# Patient Record
Sex: FEMALE | Race: WHITE | NOT HISPANIC OR LATINO
[De-identification: names, ages, dates, MRNs, and addresses within clinical notes are randomized per-mention and may not be internally consistent; named-entity substitution may affect disease eponyms.]

---

## 2017-07-05 ENCOUNTER — RX RENEWAL (OUTPATIENT)
Age: 82
End: 2017-07-05

## 2017-07-05 DIAGNOSIS — F32.9 ANXIETY DISORDER, UNSPECIFIED: ICD-10-CM

## 2017-07-05 DIAGNOSIS — E78.00 PURE HYPERCHOLESTEROLEMIA, UNSPECIFIED: ICD-10-CM

## 2017-07-05 DIAGNOSIS — F41.9 ANXIETY DISORDER, UNSPECIFIED: ICD-10-CM

## 2017-10-16 ENCOUNTER — RX RENEWAL (OUTPATIENT)
Age: 82
End: 2017-10-16

## 2017-10-16 RX ORDER — ESCITALOPRAM OXALATE 10 MG/1
10 TABLET ORAL
Qty: 30 | Refills: 0 | Status: ACTIVE | COMMUNITY
Start: 2017-07-05 | End: 1900-01-01

## 2017-10-27 ENCOUNTER — INPATIENT (INPATIENT)
Facility: HOSPITAL | Age: 82
LOS: 1 days | Discharge: HOME | End: 2017-10-29
Payer: MEDICARE

## 2017-10-27 VITALS
WEIGHT: 138.01 LBS | TEMPERATURE: 98 F | OXYGEN SATURATION: 99 % | HEART RATE: 62 BPM | HEIGHT: 67 IN | RESPIRATION RATE: 16 BRPM | DIASTOLIC BLOOD PRESSURE: 78 MMHG | SYSTOLIC BLOOD PRESSURE: 138 MMHG

## 2017-10-27 LAB
ALBUMIN SERPL ELPH-MCNC: 3.3 G/DL — SIGNIFICANT CHANGE UP (ref 3.3–5)
ALP SERPL-CCNC: 51 U/L — SIGNIFICANT CHANGE UP (ref 40–120)
ALT FLD-CCNC: 18 U/L — SIGNIFICANT CHANGE UP (ref 12–78)
ANION GAP SERPL CALC-SCNC: 6 MMOL/L — SIGNIFICANT CHANGE UP (ref 5–17)
APPEARANCE UR: CLEAR — SIGNIFICANT CHANGE UP
APTT BLD: 26.9 SEC — LOW (ref 27.5–37.4)
AST SERPL-CCNC: 24 U/L — SIGNIFICANT CHANGE UP (ref 15–37)
BACTERIA # UR AUTO: (no result)
BASOPHILS # BLD AUTO: 0.1 K/UL — SIGNIFICANT CHANGE UP (ref 0–0.2)
BASOPHILS NFR BLD AUTO: 1 % — SIGNIFICANT CHANGE UP (ref 0–2)
BILIRUB SERPL-MCNC: 0.8 MG/DL — SIGNIFICANT CHANGE UP (ref 0.2–1.2)
BILIRUB UR-MCNC: NEGATIVE — SIGNIFICANT CHANGE UP
BUN SERPL-MCNC: 26 MG/DL — HIGH (ref 7–23)
CALCIUM SERPL-MCNC: 9 MG/DL — SIGNIFICANT CHANGE UP (ref 8.5–10.1)
CHLORIDE SERPL-SCNC: 110 MMOL/L — HIGH (ref 96–108)
CO2 SERPL-SCNC: 27 MMOL/L — SIGNIFICANT CHANGE UP (ref 22–31)
COLOR SPEC: YELLOW — SIGNIFICANT CHANGE UP
CREAT SERPL-MCNC: 1.05 MG/DL — SIGNIFICANT CHANGE UP (ref 0.5–1.3)
DIFF PNL FLD: NEGATIVE — SIGNIFICANT CHANGE UP
EOSINOPHIL # BLD AUTO: 0.3 K/UL — SIGNIFICANT CHANGE UP (ref 0–0.5)
EOSINOPHIL NFR BLD AUTO: 5 % — SIGNIFICANT CHANGE UP (ref 0–6)
EPI CELLS # UR: SIGNIFICANT CHANGE UP
GLUCOSE SERPL-MCNC: 75 MG/DL — SIGNIFICANT CHANGE UP (ref 70–99)
GLUCOSE UR QL: NEGATIVE MG/DL — SIGNIFICANT CHANGE UP
HCT VFR BLD CALC: 39.2 % — SIGNIFICANT CHANGE UP (ref 34.5–45)
HGB BLD-MCNC: 12.7 G/DL — SIGNIFICANT CHANGE UP (ref 11.5–15.5)
INR BLD: 1.11 RATIO — SIGNIFICANT CHANGE UP (ref 0.88–1.16)
KETONES UR-MCNC: NEGATIVE — SIGNIFICANT CHANGE UP
LEUKOCYTE ESTERASE UR-ACNC: (no result)
LYMPHOCYTES # BLD AUTO: 1.6 K/UL — SIGNIFICANT CHANGE UP (ref 1–3.3)
LYMPHOCYTES # BLD AUTO: 17 % — SIGNIFICANT CHANGE UP (ref 13–44)
MANUAL DIF COMMENT BLD-IMP: SIGNIFICANT CHANGE UP
MCHC RBC-ENTMCNC: 28.4 PG — SIGNIFICANT CHANGE UP (ref 27–34)
MCHC RBC-ENTMCNC: 32.5 GM/DL — SIGNIFICANT CHANGE UP (ref 32–36)
MCV RBC AUTO: 87.4 FL — SIGNIFICANT CHANGE UP (ref 80–100)
MONOCYTES # BLD AUTO: 0.5 K/UL — SIGNIFICANT CHANGE UP (ref 0–0.9)
MONOCYTES NFR BLD AUTO: 7 % — SIGNIFICANT CHANGE UP (ref 2–14)
NEUTROPHILS # BLD AUTO: 3.6 K/UL — SIGNIFICANT CHANGE UP (ref 1.8–7.4)
NEUTROPHILS NFR BLD AUTO: 62 % — SIGNIFICANT CHANGE UP (ref 43–77)
NITRITE UR-MCNC: NEGATIVE — SIGNIFICANT CHANGE UP
PH UR: 6 — SIGNIFICANT CHANGE UP (ref 5–8)
PLAT MORPH BLD: NORMAL — SIGNIFICANT CHANGE UP
PLATELET # BLD AUTO: 83 K/UL — LOW (ref 150–400)
PLATELET COUNT - ESTIMATE: (no result)
POTASSIUM SERPL-MCNC: 4.7 MMOL/L — SIGNIFICANT CHANGE UP (ref 3.5–5.3)
POTASSIUM SERPL-SCNC: 4.7 MMOL/L — SIGNIFICANT CHANGE UP (ref 3.5–5.3)
PROT SERPL-MCNC: 6.7 GM/DL — SIGNIFICANT CHANGE UP (ref 6–8.3)
PROT UR-MCNC: NEGATIVE MG/DL — SIGNIFICANT CHANGE UP
PROTHROM AB SERPL-ACNC: 12 SEC — SIGNIFICANT CHANGE UP (ref 9.8–12.7)
RBC # BLD: 4.48 M/UL — SIGNIFICANT CHANGE UP (ref 3.8–5.2)
RBC # FLD: 13 % — SIGNIFICANT CHANGE UP (ref 10.3–14.5)
RBC BLD AUTO: NORMAL — SIGNIFICANT CHANGE UP
RBC CASTS # UR COMP ASSIST: NEGATIVE /HPF — SIGNIFICANT CHANGE UP (ref 0–4)
SODIUM SERPL-SCNC: 143 MMOL/L — SIGNIFICANT CHANGE UP (ref 135–145)
SP GR SPEC: 1.01 — SIGNIFICANT CHANGE UP (ref 1.01–1.02)
TROPONIN I SERPL-MCNC: <0.015 NG/ML — SIGNIFICANT CHANGE UP (ref 0.01–0.04)
UROBILINOGEN FLD QL: NEGATIVE MG/DL — SIGNIFICANT CHANGE UP
VARIANT LYMPHS # BLD: 8 % — HIGH (ref 0–6)
WBC # BLD: 6.1 K/UL — SIGNIFICANT CHANGE UP (ref 3.8–10.5)
WBC # FLD AUTO: 6.1 K/UL — SIGNIFICANT CHANGE UP (ref 3.8–10.5)
WBC UR QL: SIGNIFICANT CHANGE UP

## 2017-10-27 PROCEDURE — 93306 TTE W/DOPPLER COMPLETE: CPT | Mod: 26

## 2017-10-27 PROCEDURE — 71010: CPT | Mod: 26

## 2017-10-27 PROCEDURE — 99285 EMERGENCY DEPT VISIT HI MDM: CPT

## 2017-10-27 PROCEDURE — 70450 CT HEAD/BRAIN W/O DYE: CPT | Mod: 26

## 2017-10-27 RX ORDER — LISINOPRIL 2.5 MG/1
10 TABLET ORAL DAILY
Qty: 0 | Refills: 0 | Status: DISCONTINUED | OUTPATIENT
Start: 2017-10-27 | End: 2017-10-29

## 2017-10-27 RX ORDER — ASPIRIN/CALCIUM CARB/MAGNESIUM 324 MG
81 TABLET ORAL DAILY
Qty: 0 | Refills: 0 | Status: DISCONTINUED | OUTPATIENT
Start: 2017-10-27 | End: 2017-10-29

## 2017-10-27 RX ORDER — HEPARIN SODIUM 5000 [USP'U]/ML
5000 INJECTION INTRAVENOUS; SUBCUTANEOUS EVERY 8 HOURS
Qty: 0 | Refills: 0 | Status: DISCONTINUED | OUTPATIENT
Start: 2017-10-27 | End: 2017-10-29

## 2017-10-27 RX ORDER — SIMVASTATIN 20 MG/1
10 TABLET, FILM COATED ORAL AT BEDTIME
Qty: 0 | Refills: 0 | Status: DISCONTINUED | OUTPATIENT
Start: 2017-10-27 | End: 2017-10-29

## 2017-10-27 RX ORDER — MIRTAZAPINE 45 MG/1
15 TABLET, ORALLY DISINTEGRATING ORAL ONCE
Qty: 0 | Refills: 0 | Status: COMPLETED | OUTPATIENT
Start: 2017-10-27 | End: 2017-10-27

## 2017-10-27 RX ORDER — ACETAMINOPHEN 500 MG
650 TABLET ORAL EVERY 6 HOURS
Qty: 0 | Refills: 0 | Status: DISCONTINUED | OUTPATIENT
Start: 2017-10-27 | End: 2017-10-29

## 2017-10-27 RX ADMIN — SIMVASTATIN 10 MILLIGRAM(S): 20 TABLET, FILM COATED ORAL at 21:26

## 2017-10-27 RX ADMIN — HEPARIN SODIUM 5000 UNIT(S): 5000 INJECTION INTRAVENOUS; SUBCUTANEOUS at 21:26

## 2017-10-27 RX ADMIN — Medication 81 MILLIGRAM(S): at 15:33

## 2017-10-27 RX ADMIN — LISINOPRIL 10 MILLIGRAM(S): 2.5 TABLET ORAL at 18:32

## 2017-10-27 RX ADMIN — Medication 650 MILLIGRAM(S): at 18:40

## 2017-10-27 NOTE — H&P ADULT - NSHPSOCIALHISTORY_GEN_ALL_CORE
1 glass of wine daily   quit smoking 50 years ago  no drug use  ambulates independently  lives with aide

## 2017-10-27 NOTE — ED ADULT NURSE NOTE - OBJECTIVE STATEMENT
pt presents from home with aide at bedside. Aide states "I noticed this morning that the right side of her face was drooping and her speech didn't sound right". Pt is A&Ox1 at baseline. Pt does not have any complaints at this time, family denies h/o stroke. Pt symptoms resolved within 30min according to family. No right-sided weakness noted upon arrival. No distress noted at this time.

## 2017-10-27 NOTE — ED ADULT TRIAGE NOTE - CHIEF COMPLAINT QUOTE
patient's family reports that when patient woke up this morning had right sided facial droop and right sided weakness, which resolved within a half hour of rising. patient woke up approx 845, last known well was last night. patient now c/o right sided jaw pain, strength is equal bilaterally

## 2017-10-27 NOTE — H&P ADULT - NSHPLABSRESULTS_GEN_ALL_CORE
T(C): 36.8 (10-27-17 @ 15:03), Max: 36.8 (10-27-17 @ 10:06)  HR: 63 (10-27-17 @ 15:03) (60 - 63)  BP: 148/59 (10-27-17 @ 15:03) (130/64 - 148/59)  RR: 16 (10-27-17 @ 15:03) (16 - 16)  SpO2: 100% (10-27-17 @ 15:03) (99% - 100%)  Wt(kg): --    CARDIAC MARKERS ( 27 Oct 2017 10:29 )  <0.015 ng/mL / x     / x     / x     / x                                12.7   6.1   )-----------( 83       ( 27 Oct 2017 10:29 )             39.2     27 Oct 2017 10:29    143    |  110    |  26     ----------------------------<  75     4.7     |  27     |  1.05     Ca    9.0        27 Oct 2017 10:29    TPro  6.7    /  Alb  3.3    /  TBili  0.8    /  DBili  x      /  AST  24     /  ALT  18     /  AlkPhos  51     27 Oct 2017 10:29    PT/INR - ( 27 Oct 2017 10:29 )   PT: 12.0 sec;   INR: 1.11 ratio         PTT - ( 27 Oct 2017 10:29 )  PTT:26.9 sec  CAPILLARY BLOOD GLUCOSE        LIVER FUNCTIONS - ( 27 Oct 2017 10:29 )  Alb: 3.3 g/dL / Pro: 6.7 gm/dL / ALK PHOS: 51 U/L / ALT: 18 U/L / AST: 24 U/L / GGT: x

## 2017-10-27 NOTE — H&P ADULT - ASSESSMENT
85F with left facial droop, possible TIA/CVA      *TIA/CVA  -admit to tele  -neuro eval  -CTH noted  -MRI/MRA pending  -Echo  -daily EKG  -ASA, statin, BP control  -cardiology cx  -fall px  -PT eval, SW eval      *HTN, HLD:  -c/w home meds  -lipid panel      *DVT Px:  -Heparin sq

## 2017-10-27 NOTE — H&P ADULT - NSHPPHYSICALEXAM_GEN_ALL_CORE
PHYSICAL EXAM:    Constitutional: NAD, awake and alert, well-developed  HEENT: PERR, EOMI, Normal Hearing, MMM  Neck: Soft and supple, No LAD, No JVD  Respiratory: Breath sounds are clear bilaterally, No wheezing, rales or rhonchi  Cardiovascular: S1 and S2, regular rate and rhythm, no Murmurs, gallops or rubs  Gastrointestinal: Bowel Sounds present, soft, nontender, nondistended, no guarding, no rebound  Extremities: No peripheral edema  Vascular: 2+ peripheral pulses  Neurological: A/O x 3, no focal deficits  Musculoskeletal: 5/5 strength b/l upper and lower extremities  Skin: No rashes

## 2017-10-27 NOTE — ED ADULT NURSE NOTE - CHPI ED SYMPTOMS NEG
no tingling/no nausea/no fever/no chills/no decreased eating/drinking/no numbness/no vomiting/no dizziness

## 2017-10-27 NOTE — H&P ADULT - NSHPREVIEWOFSYSTEMS_GEN_ALL_CORE
REVIEW OF SYSTEMS:    CONSTITUTIONAL: No weakness, fevers or chills  EYES/ENT: No visual changes;  No vertigo or throat pain   NECK: No pain or stiffness  RESPIRATORY: No cough, wheezing, hemoptysis; No shortness of breath  CARDIOVASCULAR: No chest pain or palpitations  GASTROINTESTINAL: No abdominal or epigastric pain. No nausea, vomiting, or hematemesis; No diarrhea or constipation. No melena or hematochezia.  GENITOURINARY: No dysuria, frequency or hematuria  NEUROLOGICAL: No numbness or weakness  SKIN: No itching, burning, rashes, or lesions   All other review of systems is negative unless indicated above

## 2017-10-27 NOTE — ED PROVIDER NOTE - CONSTITUTIONAL, MLM
normal... Well appearing, well nourished, awake, alert, pleasantly confused and in no apparent distress.

## 2017-10-27 NOTE — ED PROVIDER NOTE - OBJECTIVE STATEMENT
86 y/o female presents with left sided facial droop noticed by her aide this morning when she woke up. Last seen normal last night before going to bed. Aide also noticed some difficulty wth speech. Sx improved upon arrival to ED. Pt denies CP, SOB, lightheadedness, or focal weakness PCP Dr. Dao.

## 2017-10-27 NOTE — H&P ADULT - HISTORY OF PRESENT ILLNESS
85F with hx of Dementia, HTN, HLD p/w left facial droop. Per aide pt woke up with left facial droop this am, lasting only a few minutes and then resolved. no associated sx. pt is only c/o right neck/upper shoulder pain.

## 2017-10-28 LAB
CULTURE RESULTS: SIGNIFICANT CHANGE UP
SPECIMEN SOURCE: SIGNIFICANT CHANGE UP

## 2017-10-28 PROCEDURE — 99222 1ST HOSP IP/OBS MODERATE 55: CPT

## 2017-10-28 PROCEDURE — 70544 MR ANGIOGRAPHY HEAD W/O DYE: CPT | Mod: 26,59

## 2017-10-28 PROCEDURE — 70551 MRI BRAIN STEM W/O DYE: CPT | Mod: 26

## 2017-10-28 PROCEDURE — 93010 ELECTROCARDIOGRAM REPORT: CPT

## 2017-10-28 PROCEDURE — 70547 MR ANGIOGRAPHY NECK W/O DYE: CPT | Mod: 26

## 2017-10-28 RX ADMIN — HEPARIN SODIUM 5000 UNIT(S): 5000 INJECTION INTRAVENOUS; SUBCUTANEOUS at 05:30

## 2017-10-28 RX ADMIN — Medication 81 MILLIGRAM(S): at 11:02

## 2017-10-28 RX ADMIN — LISINOPRIL 10 MILLIGRAM(S): 2.5 TABLET ORAL at 05:30

## 2017-10-28 RX ADMIN — Medication 650 MILLIGRAM(S): at 13:45

## 2017-10-28 RX ADMIN — HEPARIN SODIUM 5000 UNIT(S): 5000 INJECTION INTRAVENOUS; SUBCUTANEOUS at 17:00

## 2017-10-28 RX ADMIN — SIMVASTATIN 10 MILLIGRAM(S): 20 TABLET, FILM COATED ORAL at 22:10

## 2017-10-28 NOTE — PROGRESS NOTE ADULT - ASSESSMENT
Assessment and Plan:    Assessment:  · Assessment		  85F with left facial droop, possible TIA/CVA      *TIA/CVA  -admit to tele  -neuro eval  -CTH noted  -MRI/MRA pending  -Echo  -daily EKG  -ASA, statin, BP control  -cardiology cx  -fall px  -PT eval, SW eval      *HTN, HLD:  -c/w home meds  -lipid panel      *DVT Px:  -Heparin sq Assessment and Plan:    Assessment:  · Assessment		  85F with left facial droop, possible TIA/CVA      *TIA/CVA  -admit to tele  -neuro eval pending   -CTH noted  -MRI/MRA pending  -Echo  -daily EKG  -ASA, statin, BP control  -cardiology cx  -fall px  -PT eval, SW eval      *HTN, HLD:  -c/w home meds  -lipid panel    * dementia - aide at bedside  - may need kierra chair and placement by RN station       *DVT Px:  -Heparin sq

## 2017-10-28 NOTE — CONSULT NOTE ADULT - ASSESSMENT
85F with left facial droop,     # Possible TIA/CVA    - f/u WITHMRI/MRA results  -Echo  -ASA, statin,  -PT eval, SW eval  - DVT Px:  -lipid panel    # Dementia -     - no intervention at present  -supportive care 85 F with hx of Dementia, HTN, HLD presented to ED with c/o left facial droop; as per pts son she woke up on the morning of 10/27 at 9 AM, was noted to have left facial droop by the aide, son noticed facial droop and slurred speech, EMS were called, pt was administed O2, her sx resolved within few minutes, time onset was unknown as she had woken up with facial droop. In ED CTH done was negative.     # Possible TIA/lacunar CVA - minimal deficits ; TPA not given as time of onset unknown    - f/u MRI/MRA results  -Echo  - ASA, statin,  - PT eval, SW eval  - DVT Px:  - lipid panel    # Dementia -     - no intervention at present  -supportive care

## 2017-10-28 NOTE — PHYSICAL THERAPY INITIAL EVALUATION ADULT - PERTINENT HX OF CURRENT PROBLEM, REHAB EVAL
85F with hx of Dementia, HTN, HLD p/w left facial droop. Per aide pt woke up with left facial droop this am, lasting only a few minutes and then resolved. no associated sx.  CT head (-) acute findings.

## 2017-10-28 NOTE — PHYSICAL THERAPY INITIAL EVALUATION ADULT - GENERAL OBSERVATIONS, REHAB EVAL
pt received supine in bed on 3N.  pt just got back from MRI.  pt confused but cooperative.  pts personal aide and daughter at bedside.

## 2017-10-28 NOTE — PROGRESS NOTE ADULT - SUBJECTIVE AND OBJECTIVE BOX
History of Present Illness:  Chief Complaint/Reason for Admission: facial droop	  History of Present Illness: 	  85F with hx of Dementia, HTN, HLD p/w left facial droop. Per aide pt woke up with left facial droop this am, lasting only a few minutes and then resolved. no associated sx. pt is only c/o right neck/upper shoulder pain.      Review of Systems:  Review of Systems: REVIEW OF SYSTEMS:   CONSTITUTIONAL: No weakness, fevers or chills  EYES/ENT: No visual changes;  No vertigo or throat pain   NECK: No pain or stiffness  RESPIRATORY: No cough, wheezing, hemoptysis; No shortness of breath  CARDIOVASCULAR: No chest pain or palpitations  GASTROINTESTINAL: No abdominal or epigastric pain. No nausea, vomiting, or hematemesis; No diarrhea or constipation. No melena or hematochezia.  GENITOURINARY: No dysuria, frequency or hematuria  NEUROLOGICAL: No numbness or weakness  SKIN: No itching, burning, rashes, or lesions  All other review of systems is negative unless indicated above	      Allergies and Intolerances:        Allergies:  	penicillins: Drug Category, Other, Originally Entered as [Rash] reaction to [PENICILLINS]  	azithromycin: Drug, Unknown, Originally Entered as [Unknown] reaction to [AZITHROMYCIN]    Home Medications:   * Patient Currently Takes Medications as of 27-Oct-2017 14:52 documented in Structured Notes  · 	aspirin 81 mg oral tablet: 1 tab(s) orally once a day, Last Dose Taken:    · 	ramipril 2.5 mg oral tablet: 1 dose(s) orally once a day, Last Dose Taken:    · 	mirtazapine 15 mg oral tablet: 1 tab(s) orally once a day (at bedtime), Last Dose Taken:    · 	Zocor 10 mg oral tablet: 1 tab(s) orally once a day (at bedtime), Last Dose Taken:      Patient History:    Past Medical History:  Dementia.     Past Surgical History:  No significant past surgical history.     Social History:  Social History (marital status, living situation, occupation, tobacco use, alcohol and drug use, and sexual history): 1 glass of wine daily   quit smoking 50 years ago  no drug use  ambulates independently lives with aide	  10-28    140  |  110<H>  |  24<H>  ----------------------------<  87  4.4   |  26  |  0.95    Ca    9.0      28 Oct 2017 07:10    TPro  6.7  /  Alb  3.3  /  TBili  0.8  /  DBili  x   /  AST  24  /  ALT  18  /  AlkPhos  51  10-27     Tobacco Screening:  · Core Measure Site	No	    Risk Assessment:    Present on Admission:  Deep Venous Thrombosis	no	  Pulmonary Embolus	no	     Heart Failure: Heart Failure:  Does this patient have a history of or has been diagnosed with heart failure? no.  Physical Exam:  Physical Exam: PHYSICAL EXAM: Vital Signs Last 24 Hrs T(C): 36.5 (28 Oct 2017 10:25), Max: 36.8 (27 Oct 2017 15:03) T(F): 97.7 (28 Oct 2017 10:25), Max: 98.3 (27 Oct 2017 15:03) HR: 67 (28 Oct 2017 10:25) (60 - 67) BP: 110/51 (28 Oct 2017 10:25) (110/51 - 158/65) BP(mean): -- RR: 18 (28 Oct 2017 10:25) (16 - 18) SpO2: 98% (28 Oct 2017 10:25) (97% - 100%)   Constitutional: NAD, awake and alert, well-developed  HEENT: PERR, EOMI, Normal Hearing, MMM  Neck: Soft and supple, No LAD, No JVD  Respiratory: Breath sounds are clear bilaterally, No wheezing, rales or rhonchi  Cardiovascular: S1 and S2, regular rate and rhythm, no Murmurs, gallops or rubs  Gastrointestinal: Bowel Sounds present, soft, nontender, nondistended, no guarding, no rebound  Extremities: No peripheral edema  Vascular: 2+ peripheral pulses  Neurological: A/O x 3, no focal deficits  Musculoskeletal: 5/5 strength b/l upper and lower extremities Skin: No rashes                       13.0  5.2   )-----------( 76       ( 28 Oct 2017 07:10 )             39.8  10-28  140  |  110<H>  |  24<H> ----------------------------<  87 4.4   |  26  |  0.95  Ca    9.0      28 Oct 2017 07:10  TPro  6.7  /  Alb  3.3  /  TBili  0.8  /  DBili  x   /  AST  24  /  ALT  18  /  AlkPhos  51  10-27 PT/INR - ( 27 Oct 2017 10:29 )   PT: 12.0 sec;   INR: 1.11 ratio      PTT - ( 27 Oct 2017 10:29 )  PTT:26.9 sec LIVER FUNCTIONS - ( 27 Oct 2017 10:29 ) Alb: 3.3 g/dL / Pro: 6.7 gm/dL / ALK PHOS: 51 U/L / ALT: 18 U/L / AST: 24 U/L / GGT: x        	        Does this patient have a history of or has been diagnosed with heart failure? no. Chief Complaint/Reason for Admission: facial droop	   History of Present Illness: 	  85F with hx of Dementia, HTN, HLD p/w left facial droop. Per aide pt woke up with left facial droop this am, lasting only a few minutes and then resolved. no associated sx. pt is only c/o right neck/upper shoulder pain.   10/28 - spoke with aide and son who was on phone. dtr at bedside. pt had c/o neck and back pain night before as per son and adie found pt with left side facial droop in AM with time of onset unknown.  pt agitated at times and requiring placement in kierra chair by RN station.  given tylenol for HA.       Review of Systems:    Physical Exam:  Physical Exam: PHYSICAL EXAM: Vital Signs Last 24 Hrs T(C): 36.5 (28 Oct 2017 10:25), Max: 36.8 (27 Oct 2017 15:03) T(F): 97.7 (28 Oct 2017 10:25), Max: 98.3 (27 Oct 2017 15:03) HR: 67 (28 Oct 2017 10:25) (60 - 67) BP: 110/51 (28 Oct 2017 10:25) (110/51 - 158/65) BP(mean): -- RR: 18 (28 Oct 2017 10:25) (16 - 18) SpO2: 98% (28 Oct 2017 10:25) (97% - 100%)   Constitutional: NAD, awake and alert, well-developed  HEENT: PERR, EOMI, Normal Hearing, MMM  Neck: Soft and supple, No LAD, No JVD  Respiratory: Breath sounds are clear bilaterally, No wheezing, rales or rhonchi  Cardiovascular: S1 and S2, regular rate and rhythm, no Murmurs, gallops or rubs  Gastrointestinal: Bowel Sounds present, soft, nontender, nondistended, no guarding, no rebound  Extremities: No peripheral edema  Vascular: 2+ peripheral pulses  Neurological: A/O x 3, mild left facial droop   Musculoskeletal: 5/5 strength b/l upper and lower extremities Skin: No rashes                       13.0  5.2   )-----------( 76       ( 28 Oct 2017 07:10 )             39.8  10-28  140  |  110<H>  |  24<H> ----------------------------<  87 4.4   |  26  |  0.95  Ca    9.0      28 Oct 2017 07:10  TPro  6.7  /  Alb  3.3  /  TBili  0.8  /  DBili  x   /  AST  24  /  ALT  18  /  AlkPhos  51  10-27 PT/INR - ( 27 Oct 2017 10:29 )   PT: 12.0 sec;   INR: 1.11 ratio      PTT - ( 27 Oct 2017 10:29 )  PTT:26.9 sec LIVER FUNCTIONS - ( 27 Oct 2017 10:29 ) Alb: 3.3 g/dL / Pro: 6.7 gm/dL / ALK PHOS: 51 U/L / ALT: 18 U/L / AST: 24 U/L / GGT: x        	        Does this patient have a history of or has been diagnosed with heart failure? no.

## 2017-10-28 NOTE — CONSULT NOTE ADULT - SUBJECTIVE AND OBJECTIVE BOX
CC: 85y old  Female who presents with a chief complaint of facial droop (27 Oct 2017 15:16)      HPI:  85F with hx of Dementia, HTN, HLD p/w left facial droop; as per pts aide pt woke up with left facial droop in the morning, lasting only a few minutes and then resolved. No associated sx. pt is only c/o right neck/upper shoulder pain. (27 Oct 2017 15:16)    As per Dr. Hernandez conversation with the aide and son on phone, pt had c/o neck and back pain night before, and adie found pt with left side facial droop in AM, time onset was unknown.      Pt has been observed, she was c/o HA, resolved with       PAST MEDICAL & SURGICAL HISTORY:  Dementia, HTN, HLD    FAMILY HISTORY:  Unable to obtain      Social Hx:  1 glass of wine daily , quit smoking 50 years ago  no drug use, ambulates independently, lives with aide    MEDICATIONS  (STANDING):  aspirin  chewable 81 milliGRAM(s) Oral daily  heparin  Injectable 5000 Unit(s) SubCutaneous every 8 hours  lisinopril 10 milliGRAM(s) Oral daily  simvastatin 10 milliGRAM(s) Oral at bedtime     Allergies  azithromycin (Unknown)  penicillins (Other)    ROS: Pertinent positives in HPI, all other ROS were reviewed and are negative.      Vital Signs Last 24 Hrs  T(C): 36.5 (28 Oct 2017 10:25), Max: 36.7 (27 Oct 2017 17:50)  T(F): 97.7 (28 Oct 2017 10:25), Max: 98.1 (27 Oct 2017 17:50)  HR: 67 (28 Oct 2017 10:25) (62 - 67)  BP: 110/51 (28 Oct 2017 10:25) (110/51 - 158/65)  BP(mean): --  RR: 18 (28 Oct 2017 10:25) (16 - 18)  SpO2: 98% (28 Oct 2017 10:25) (97% - 100%)      GE:  Constitutional: awake and alert.  HEENT: PERRLA, EOMI,   Neck: Supple.  Respiratory: Breath sounds are clear bilaterally  Cardiovascular: S1 and S2, regular / irregular rhythm  Gastrointestinal: soft, nontender  Extremities:  no edema  Vascular: Caritid Bruit - no  Musculoskeletal: no joint swelling/tenderness, no abnormal movements  Skin: No rashes    Neurological exam:  HF: A x O x 3. Appropriately interactive, normal affect. Speech fluent, No Aphasia or paraphasic errors. Naming /repetition intact   CN: VAL, EOMI, VFF, facial sensation normal, no NLFD, tongue midline, Palate moves equally, SCM equal bilaterally  Motor: No pronator drift, Strength 5/5 in all 4 ext, normal bulk and tone, no tremor, rigidity or bradykinesia.    Sens: Intact to light touch / PP/ VS/ JS    Reflexes: Symmetric and normal . BJ 2+, BR 2+, KJ 2+, AJ 2+, downgoing toes b/l  Coord:  No FNFA, dysmetria, LILA intact   Gait/Balance: Normal/Cannot test    NIHSS:          Labs:   10-28    140  |  110<H>  |  24<H>  ----------------------------<  87  4.4   |  26  |  0.95    Ca    9.0      28 Oct 2017 07:10    TPro  6.7  /  Alb  3.3  /  TBili  0.8  /  DBili  x   /  AST  24  /  ALT  18  /  AlkPhos  51  10-27    10-28 Chol 128 LDL 74 HDL 28<L> Trig 129  10-28 [id number] 5.2                          13.0   5.2   )-----------( 76       ( 28 Oct 2017 07:10 )             39.8       Radiology:  - CT Head: < from: CT Head No Cont (10.27.17 @ 12:11) >  No acute intracranial findings.    < end of copied text > CC: 85y old  Female who presents with a chief complaint of left facial droop (27 Oct 2017 15:16)      HPI: History obtained from son by bedside.  85 F with hx of Dementia, HTN, HLD presented to ED with c/o left facial droop; as per pts son she woke up on the morning of 10/27 around 9 AM, was noted to have left facial droop by the aide, she called pts son, he noticed facial droop and slurred speech, EMS were called, pt was administed O2, her sx resolved within few minutes, time onset was unknown as she had woken up with facial droop.      As per pts son she had c/o neck and back pain night before. In ED CTH done was negative.     Pt has been observed overnite, has no visual blurring, diplopia, N, V, V, tinnitus or paresthesias, she was c/o HA, resolved with  Tylenol    PAST MEDICAL & SURGICAL HISTORY:  Dementia, HTN, HLD    FAMILY HISTORY:  Unable to obtain    Social Hx:  1 glass of wine daily , quit smoking 50 years ago  no drug use, ambulates independently, lives with son and his family, [facility]    MEDICATIONS  (STANDING):  aspirin  chewable 81 milliGRAM(s) Oral daily  heparin  Injectable 5000 Unit(s) SubCutaneous every 8 hours  lisinopril 10 milliGRAM(s) Oral daily  simvastatin 10 milliGRAM(s) Oral at bedtime     Allergies  azithromycin (Unknown)  penicillins (Other)    ROS: Pertinent positives in HPI, all other ROS were reviewed and are negative.      Vital Signs Last 24 Hrs  T(C): 36.5 (28 Oct 2017 10:25), Max: 36.7 (27 Oct 2017 17:50)  T(F): 97.7 (28 Oct 2017 10:25), Max: 98.1 (27 Oct 2017 17:50)  HR: 67 (28 Oct 2017 10:25) (62 - 67)  BP: 110/51 (28 Oct 2017 10:25) (110/51 - 158/65)  BP(mean): --  RR: 18 (28 Oct 2017 10:25) (16 - 18)  SpO2: 98% (28 Oct 2017 10:25) (97% - 100%)      GE:  Constitutional: awake and alert.  HEENT: No neck mass   Neck: Supple.  Respiratory: Breath sounds are clear bilaterally  Cardiovascular: S1 and S2, regular / irregular rhythm  Extremities:  no edema  Vascular: Caritid Bruit - no  Musculoskeletal: no joint swelling/tenderness, no abnormal movements  Skin: No rashes    Neurological exam:  HF: A x O x 2. Speech fluent, No Aphasia. Naming /repetition intact, IR impaired  CN: VAL, EOMI, VFF, facial sensation normal, no NLFD, tongue midline, Palate moves equally, SCM equal bilaterally  Motor: No pronator drift, Strength 5/5 in all 4 ext, normal bulk and tone, no tremor.    Sens: Intact to light touch     Reflexes: BJ 2+, BR 2+, KJ 2+, AJ 0, downgoing toes b/l  Coord:  No FNFA, dysmetria, LILA intact   Gait/Balance: Not tested    NIHSS: 0    Labs:   10-28    140  |  110<H>  |  24<H>  ----------------------------<  87  4.4   |  26  |  0.95    Ca    9.0      28 Oct 2017 07:10    TPro  6.7  /  Alb  3.3  /  TBili  0.8  /  DBili  x   /  AST  24  /  ALT  18  /  AlkPhos  51  10-27    10-28 Chol 128 LDL 74 HDL 28<L> Trig 129  10-28 [id number] 5.2                          13.0   5.2   )-----------( 76       ( 28 Oct 2017 07:10 )             39.8       Radiology:  - CT Head: < from: CT Head No Cont (10.27.17 @ 12:11) >  No acute intracranial findings.    < end of copied text >

## 2017-10-29 ENCOUNTER — TRANSCRIPTION ENCOUNTER (OUTPATIENT)
Age: 82
End: 2017-10-29

## 2017-10-29 VITALS
RESPIRATION RATE: 17 BRPM | SYSTOLIC BLOOD PRESSURE: 116 MMHG | DIASTOLIC BLOOD PRESSURE: 61 MMHG | OXYGEN SATURATION: 100 % | HEART RATE: 65 BPM | TEMPERATURE: 98 F

## 2017-10-29 LAB
ALBUMIN SERPL ELPH-MCNC: 3.2 G/DL — LOW (ref 3.3–5)
ALP SERPL-CCNC: 55 U/L — SIGNIFICANT CHANGE UP (ref 40–120)
ALT FLD-CCNC: 19 U/L — SIGNIFICANT CHANGE UP (ref 12–78)
ANION GAP SERPL CALC-SCNC: 8 MMOL/L — SIGNIFICANT CHANGE UP (ref 5–17)
AST SERPL-CCNC: 21 U/L — SIGNIFICANT CHANGE UP (ref 15–37)
BILIRUB SERPL-MCNC: 0.5 MG/DL — SIGNIFICANT CHANGE UP (ref 0.2–1.2)
BUN SERPL-MCNC: 19 MG/DL — SIGNIFICANT CHANGE UP (ref 7–23)
CALCIUM SERPL-MCNC: 9.1 MG/DL — SIGNIFICANT CHANGE UP (ref 8.5–10.1)
CHLORIDE SERPL-SCNC: 108 MMOL/L — SIGNIFICANT CHANGE UP (ref 96–108)
CO2 SERPL-SCNC: 27 MMOL/L — SIGNIFICANT CHANGE UP (ref 22–31)
CREAT SERPL-MCNC: 0.99 MG/DL — SIGNIFICANT CHANGE UP (ref 0.5–1.3)
GLUCOSE SERPL-MCNC: 83 MG/DL — SIGNIFICANT CHANGE UP (ref 70–99)
HCT VFR BLD CALC: 37.1 % — SIGNIFICANT CHANGE UP (ref 34.5–45)
HGB BLD-MCNC: 12.1 G/DL — SIGNIFICANT CHANGE UP (ref 11.5–15.5)
MCHC RBC-ENTMCNC: 28.5 PG — SIGNIFICANT CHANGE UP (ref 27–34)
MCHC RBC-ENTMCNC: 32.5 GM/DL — SIGNIFICANT CHANGE UP (ref 32–36)
MCV RBC AUTO: 87.5 FL — SIGNIFICANT CHANGE UP (ref 80–100)
PLATELET # BLD AUTO: 86 K/UL — LOW (ref 150–400)
POTASSIUM SERPL-MCNC: 4.3 MMOL/L — SIGNIFICANT CHANGE UP (ref 3.5–5.3)
POTASSIUM SERPL-SCNC: 4.3 MMOL/L — SIGNIFICANT CHANGE UP (ref 3.5–5.3)
PROT SERPL-MCNC: 6.6 GM/DL — SIGNIFICANT CHANGE UP (ref 6–8.3)
RBC # BLD: 4.24 M/UL — SIGNIFICANT CHANGE UP (ref 3.8–5.2)
RBC # FLD: 13.3 % — SIGNIFICANT CHANGE UP (ref 10.3–14.5)
SODIUM SERPL-SCNC: 143 MMOL/L — SIGNIFICANT CHANGE UP (ref 135–145)
WBC # BLD: 5.3 K/UL — SIGNIFICANT CHANGE UP (ref 3.8–10.5)
WBC # FLD AUTO: 5.3 K/UL — SIGNIFICANT CHANGE UP (ref 3.8–10.5)

## 2017-10-29 PROCEDURE — 99232 SBSQ HOSP IP/OBS MODERATE 35: CPT

## 2017-10-29 RX ORDER — ACETAMINOPHEN 500 MG
2 TABLET ORAL
Qty: 0 | Refills: 0 | COMMUNITY
Start: 2017-10-29

## 2017-10-29 RX ADMIN — Medication 81 MILLIGRAM(S): at 11:19

## 2017-10-29 RX ADMIN — LISINOPRIL 10 MILLIGRAM(S): 2.5 TABLET ORAL at 06:10

## 2017-10-29 NOTE — SWALLOW BEDSIDE ASSESSMENT ADULT - ORAL PHASE
Bolus formation/transfer were mechanically WFL for age without an overt oral motor focality, She cleared oral debris after age acceptable piecemeal deglutition.

## 2017-10-29 NOTE — SWALLOW BEDSIDE ASSESSMENT ADULT - SLP GENERAL OBSERVATIONS
Pt was alert when encountered. She was interactive but internally distractible at times. Pt was able to verbalize during communicative probes/in conversation. Her utterances were intelligible, fluent and linguistically intact. Further, no overt Dysarthria, Verbal Apraxia or Aphasia were evident on exam. However, it should be noted that her underlying utterances reflected variably reduced orientation/STM/insight consistent with Cognitive Dysfunction which are chronic due to longstanding dementia, She is reportedly at communicative baseline. Note that she denied Dysphagia when asked.

## 2017-10-29 NOTE — DISCHARGE NOTE ADULT - CARE PROVIDERS DIRECT ADDRESSES
,DirectAddress_Unknown,[email or web address]

## 2017-10-29 NOTE — PROGRESS NOTE ADULT - SUBJECTIVE AND OBJECTIVE BOX
S&O:  Pt stable, no facial droop os slurred speech, back to baseline    MRI reveals no acute infract, MRA no significant stenosis    MEDICATIONS  (STANDING):  aspirin  chewable 81 milliGRAM(s) Oral daily  heparin  Injectable 5000 Unit(s) SubCutaneous every 8 hours  lisinopril 10 milliGRAM(s) Oral daily  simvastatin 10 milliGRAM(s) Oral at bedtime      Vital Signs Last 24 Hrs  T(C): 36.6 (29 Oct 2017 09:53), Max: 36.7 (28 Oct 2017 19:31)  T(F): 97.8 (29 Oct 2017 09:53), Max: 98.1 (28 Oct 2017 19:31)  HR: 65 (29 Oct 2017 09:53) (60 - 67)  BP: 116/61 (29 Oct 2017 09:53) (116/61 - 129/52)  BP(mean): --  RR: 17 (29 Oct 2017 09:53) (15 - 18)  SpO2: 100% (29 Oct 2017 09:53) (97% - 100%)     Neurological exam:  HF: A x O x 2. Speech fluent, No Aphasia. Naming /repetition intact, IR impaired  CN: VAL, EOMI, VFF, facial sensation normal, no NLFD, tongue midline, Palate moves equally, SCM equal bilaterally  Motor: No pronator drift, Strength 5/5 in all 4 ext, normal bulk and tone, no tremor.    Sens: Intact to light touch     Reflexes: BJ 2+, BR 2+, KJ 2+, AJ 0, downgoing toes b/l  Coord:  No FNFA, dysmetria, LILA intact   Gait/Balance: Not tested    NIHSS: 0                       12.1   5.3   )-----------( 86       ( 29 Oct 2017 06:37 )             37.1     10-29    143  |  108  |  19  ----------------------------<  83  4.3   |  27  |  0.99    Ca    9.1      29 Oct 2017 06:37    TPro  6.6  /  Alb  3.2<L>  /  TBili  0.5  /  DBili  x   /  AST  21  /  ALT  19  /  AlkPhos  55  10-29    10-28 [id number] 5.2    10-28 Chol 128 LDL 74 HDL 28<L> Trig 129    Radiology report:  -MRI reveals no acute infract, MRA no significant stenosis

## 2017-10-29 NOTE — DISCHARGE NOTE ADULT - HOSPITAL COURSE
History of Present Illness:   85F with hx of Dementia, HTN, HLD p/w left facial droop. Per aide pt woke up with left facial droop this am, lasting only a few minutes and then resolved. no associated sx. pt is only c/o right neck/upper shoulder pain.   10/28 - spoke with aide and son who was on phone. dtr at bedside. pt had c/o neck and back pain night before as per son and adie found pt with left side facial droop in AM with time of onset unknown.  pt agitated at times and requiring placement in kierra chair by RN station.  given tylenol for HA.       Review of Systems:    Physical Exam:  Physical Exam: PHYSICAL EXAM:  Vital Signs Last 24 Hrs  T(C): 36.5 (28 Oct 2017 10:25), Max: 36.8 (27 Oct 2017 15:03)  T(F): 97.7 (28 Oct 2017 10:25), Max: 98.3 (27 Oct 2017 15:03)  HR: 67 (28 Oct 2017 10:25) (60 - 67)  BP: 110/51 (28 Oct 2017 10:25) (110/51 - 158/65)  BP(mean): --  RR: 18 (28 Oct 2017 10:25) (16 - 18)  SpO2: 98% (28 Oct 2017 10:25) (97% - 100%)    	Constitutional: NAD, awake and alert, well-developed  	HEENT: PERR, EOMI, Normal Hearing, MMM  	Neck: Soft and supple, No LAD, No JVD  	Respiratory: Breath sounds are clear bilaterally, No wheezing, rales or rhonchi  	Cardiovascular: S1 and S2, regular rate and rhythm, no Murmurs, gallops or rubs  	Gastrointestinal: Bowel Sounds present, soft, nontender, nondistended, no guarding, no rebound  	Extremities: No peripheral edema  	Vascular: 2+ peripheral pulses  	Neurological: A/O x 3, mild left facial droop   	Musculoskeletal: 5/5 strength b/l upper and lower extremities  Skin: No rashes                          13.0   5.2   )-----------( 76       ( 28 Oct 2017 07:10 )               39.8   10-28    140  |  110<H>  |  24<H>  ----------------------------<  87  4.4   |  26  |  0.95    Ca    9.0      28 Oct 2017 07:10    TPro  6.7  /  Alb  3.3  /  TBili  0.8  /  DBili  x   /  AST  24  /  ALT  18  /  AlkPhos  51  10-27  PT/INR - ( 27 Oct 2017 10:29 )   PT: 12.0 sec;   INR: 1.11 ratio         PTT - ( 27 Oct 2017 10:29 )  PTT:26.9 sec  LIVER FUNCTIONS - ( 27 Oct 2017 10:29 )  Alb: 3.3 g/dL / Pro: 6.7 gm/dL / ALK PHOS: 51 U/L / ALT: 18 U/L / AST: 24 U/L / GGT: x PMD - Rikki Petit (notified of admission and discharge)     cc: - facial droop   85F with hx of Dementia, HTN, HLD p/w left facial droop. Per aide pt woke up with left facial droop this am, lasting only a few minutes and then resolved. no associated sx. pt is only c/o right neck/upper shoulder pain.   10/28 - spoke with aide and son who was on phone. dtr at bedside. pt had c/o neck and back pain night before as per son and adie found pt with left side facial droop in AM with time of onset unknown but resolved after given oxygen by EMS.  had MRI/MRA which did not show evidence of acute CVA.    pt seen by dr javed and feels likely TIA.  case d/w son and has aides in place willd /w home care and home PT.  see by PT and s/s ruby.        Physical Exam:  Physical Exam: PHYSICAL EXAM:  Vital Signs Last 24 Hrs  T(C): 36.5 (28 Oct 2017 10:25), Max: 36.8 (27 Oct 2017 15:03)  T(F): 97.7 (28 Oct 2017 10:25), Max: 98.3 (27 Oct 2017 15:03)  HR: 67 (28 Oct 2017 10:25) (60 - 67)  BP: 110/51 (28 Oct 2017 10:25) (110/51 - 158/65)  BP(mean): --  RR: 18 (28 Oct 2017 10:25) (16 - 18)  SpO2: 98% (28 Oct 2017 10:25) (97% - 100%)    	Constitutional: NAD, awake and alert, well-developed  	HEENT: PERR, EOMI, Normal Hearing, MMM  	Neck: Soft and supple, No LAD, No JVD  	Respiratory: Breath sounds are clear bilaterally, No wheezing, rales or rhonchi  	Cardiovascular: S1 and S2, regular rate and rhythm, no Murmurs, gallops or rubs  	Gastrointestinal: Bowel Sounds present, soft, nontender, nondistended, no guarding, no rebound  	Extremities: No peripheral edema  	Vascular: 2+ peripheral pulses  	Neurological: A/O x 3, mild left facial droop   	Musculoskeletal: 5/5 strength b/l upper and lower extremities  Skin: No rashes       total time spent on d/c 40 mins

## 2017-10-29 NOTE — SWALLOW BEDSIDE ASSESSMENT ADULT - COMMENTS
The patient was admitted to  with onset of a left facial droop and slurred speech that improved. TIA was suspected. This profile is superimposed upon a h/o dementia, HTN, and hyperlipidemia.

## 2017-10-29 NOTE — SWALLOW BEDSIDE ASSESSMENT ADULT - SWALLOW EVAL: CRITERIA FOR SKILLED INTERVENTION MET
Acute speech path intervention is not clinically warranted and would not . Pt at suspected communicative/swallow baseline. Thus, WILL NOT ACTIVELY FOLLOW. Reconsult PRN.

## 2017-10-29 NOTE — PROGRESS NOTE ADULT - ASSESSMENT
85 F with hx of Dementia, HTN, HLD presented to ED with c/o left facial droop; as per pts son she woke up on the morning of 10/27 at 9 AM, was noted to have left facial droop by the aide, son noticed facial droop and slurred speech, EMS were called, pt was administed O2, her sx resolved within few minutes, time onset was unknown as she had woken up with facial droop. In ED CTH done was negative.     # Possible TIA - minimal deficits ; TPA not given as time of onset unknown; MRI /A brain no acute findings      - ASA, statin,  - PT eval    # Dementia -     - no intervention at present  -supportive care    D/W pt and Dr. Begum

## 2017-10-29 NOTE — DISCHARGE NOTE ADULT - CARE PROVIDER_API CALL
Rikki Petit), Family Medicine  [address] [place] [address]  Phone: [phone]  Fax: [phone]    Elizabeth Almendarez), Neurology  [address] [place] [address]  Phone: [phone]  Fax: [phone]

## 2017-10-29 NOTE — DISCHARGE NOTE ADULT - MEDICATION SUMMARY - MEDICATIONS TO TAKE
I will START or STAY ON the medications listed below when I get home from the hospital:    aspirin 81 mg oral tablet  -- 1 tab(s) by mouth once a day  -- Indication: For heart medicine    acetaminophen 325 mg oral tablet  -- 2 tab(s) by mouth every 6 hours, As needed, Mild Pain (1 - 3)  -- Indication: For pain medicine    ramipril 2.5 mg oral tablet  -- 1 dose(s) by mouth once a day  -- Indication: For heart medicine/blood pressure    mirtazapine 15 mg oral tablet  -- 1 tab(s) by mouth once a day (at bedtime)  -- Indication: For mood    Zocor 10 mg oral tablet  -- 1 tab(s) by mouth once a day (at bedtime)  -- Indication: For heart medicine

## 2017-10-29 NOTE — DISCHARGE NOTE ADULT - PATIENT PORTAL LINK FT
“You can access the [facility] Patient Portal, offered by [facility], by registering with the following website: [email or web address]”

## 2017-10-29 NOTE — SWALLOW BEDSIDE ASSESSMENT ADULT - ASR SWALLOW RECOMMEND DIAG
Defer MBS as pt appeared clinically tolerant of suggested PO textures from an oropharyngeal swallowing stance. Pt did NOT appear to be aspirating on clinical exam.

## 2017-10-29 NOTE — SWALLOW BEDSIDE ASSESSMENT ADULT - SWALLOW EVAL: RECOMMENDED DIET
SUGGEST A REGULAR TEXTURE DIET WITH THIN LIQUID CONSISTENCIES AS PATIENT APPEARS TO TOLERATE THESE FOOD CONSISTENCIES FROM AN OROPHARYNGEAL SWALLOWING STANCE ON EXAM.

## 2017-10-29 NOTE — DISCHARGE NOTE ADULT - PLAN OF CARE
prevention of future events outpatient follow up with neurology and continue aspirin and statin which is the standard of care for such an event

## 2017-10-29 NOTE — SWALLOW BEDSIDE ASSESSMENT ADULT - SWALLOW EVAL: DIAGNOSIS
1) Pt demonstrates Oropharyngeal Swallowing which subjectively appeared to be stable and within functional parameters for age. No behavioral aspiration signs were exhibited. Odynophagia was denied,   2) Pt was able to verbalize during communicative probes/in conversation. Her utterances were intelligible, fluent and linguistically intact. Further, no overt Dysarthria, Verbal Apraxia or Aphasia were evident on exam. However, it should be noted that her underlying utterances reflected variably reduced orientation/STM/insight consistent with Cognitive Dysfunction which are chronic due to longstanding dementia, She is reportedly at communicative baseline.

## 2017-10-29 NOTE — DISCHARGE NOTE ADULT - CARE PLAN
Principal Discharge DX:	Transient cerebral ischemia, unspecified type  Goal:	prevention of future events  Instructions for follow-up, activity and diet:	outpatient follow up with neurology and continue aspirin and statin which is the standard of care for such an event

## 2017-10-29 NOTE — SWALLOW BEDSIDE ASSESSMENT ADULT - PHARYNGEAL PHASE
Swallow triggered in an acceptable time frame for age. laryngeal lift on palpation during swallow trials was functional and within age acceptable parameters. no behavioral aspiration signs exhibited. Odynophagia was denied. Swallow triggered in an acceptable time frame for age. Laryngeal lift on palpation during swallow trials was functional and within age acceptable parameters. No behavioral aspiration signs exhibited. Odynophagia was denied.

## 2017-11-02 DIAGNOSIS — Z87.891 PERSONAL HISTORY OF NICOTINE DEPENDENCE: ICD-10-CM

## 2017-11-02 DIAGNOSIS — G45.9 TRANSIENT CEREBRAL ISCHEMIC ATTACK, UNSPECIFIED: ICD-10-CM

## 2017-11-02 DIAGNOSIS — I10 ESSENTIAL (PRIMARY) HYPERTENSION: ICD-10-CM

## 2017-11-02 DIAGNOSIS — Z88.0 ALLERGY STATUS TO PENICILLIN: ICD-10-CM

## 2017-11-02 DIAGNOSIS — R29.810 FACIAL WEAKNESS: ICD-10-CM

## 2017-11-02 DIAGNOSIS — Z79.82 LONG TERM (CURRENT) USE OF ASPIRIN: ICD-10-CM

## 2017-11-02 DIAGNOSIS — F03.90 UNSPECIFIED DEMENTIA WITHOUT BEHAVIORAL DISTURBANCE: ICD-10-CM

## 2017-11-02 DIAGNOSIS — E78.5 HYPERLIPIDEMIA, UNSPECIFIED: ICD-10-CM

## 2017-12-16 ENCOUNTER — INPATIENT (INPATIENT)
Facility: HOSPITAL | Age: 82
LOS: 4 days | Discharge: HOME HEALTH | End: 2017-12-21
Attending: INTERNAL MEDICINE | Admitting: INTERNAL MEDICINE
Payer: MEDICARE

## 2017-12-16 VITALS — WEIGHT: 130.07 LBS | HEIGHT: 62 IN

## 2017-12-16 LAB
ALBUMIN SERPL ELPH-MCNC: 3.3 G/DL — SIGNIFICANT CHANGE UP (ref 3.3–5)
ALP SERPL-CCNC: 53 U/L — SIGNIFICANT CHANGE UP (ref 40–120)
ALT FLD-CCNC: 39 U/L — SIGNIFICANT CHANGE UP (ref 12–78)
ANION GAP SERPL CALC-SCNC: 11 MMOL/L — SIGNIFICANT CHANGE UP (ref 5–17)
APPEARANCE UR: CLEAR — SIGNIFICANT CHANGE UP
AST SERPL-CCNC: 26 U/L — SIGNIFICANT CHANGE UP (ref 15–37)
BACTERIA # UR AUTO: (no result)
BASOPHILS # BLD AUTO: 0.1 K/UL — SIGNIFICANT CHANGE UP (ref 0–0.2)
BASOPHILS NFR BLD AUTO: 0.7 % — SIGNIFICANT CHANGE UP (ref 0–2)
BILIRUB SERPL-MCNC: 1.4 MG/DL — HIGH (ref 0.2–1.2)
BILIRUB UR-MCNC: NEGATIVE — SIGNIFICANT CHANGE UP
BUN SERPL-MCNC: 27 MG/DL — HIGH (ref 7–23)
CALCIUM SERPL-MCNC: 9 MG/DL — SIGNIFICANT CHANGE UP (ref 8.5–10.1)
CHLORIDE SERPL-SCNC: 107 MMOL/L — SIGNIFICANT CHANGE UP (ref 96–108)
CO2 SERPL-SCNC: 21 MMOL/L — LOW (ref 22–31)
COLOR SPEC: YELLOW — SIGNIFICANT CHANGE UP
COMMENT - URINE: SIGNIFICANT CHANGE UP
CREAT SERPL-MCNC: 1.38 MG/DL — HIGH (ref 0.5–1.3)
DIFF PNL FLD: (no result)
EOSINOPHIL # BLD AUTO: 0 K/UL — SIGNIFICANT CHANGE UP (ref 0–0.5)
EOSINOPHIL NFR BLD AUTO: 0.2 % — SIGNIFICANT CHANGE UP (ref 0–6)
EPI CELLS # UR: SIGNIFICANT CHANGE UP
GLUCOSE SERPL-MCNC: 104 MG/DL — HIGH (ref 70–99)
GLUCOSE UR QL: NEGATIVE MG/DL — SIGNIFICANT CHANGE UP
HCT VFR BLD CALC: 45.3 % — HIGH (ref 34.5–45)
HGB BLD-MCNC: 14.9 G/DL — SIGNIFICANT CHANGE UP (ref 11.5–15.5)
KETONES UR-MCNC: NEGATIVE — SIGNIFICANT CHANGE UP
LACTATE SERPL-SCNC: 2 MMOL/L — SIGNIFICANT CHANGE UP (ref 0.7–2)
LACTATE SERPL-SCNC: 2.2 MMOL/L — HIGH (ref 0.7–2)
LEUKOCYTE ESTERASE UR-ACNC: (no result)
LYMPHOCYTES # BLD AUTO: 1.4 K/UL — SIGNIFICANT CHANGE UP (ref 1–3.3)
LYMPHOCYTES # BLD AUTO: 8.7 % — LOW (ref 13–44)
MCHC RBC-ENTMCNC: 29.3 PG — SIGNIFICANT CHANGE UP (ref 27–34)
MCHC RBC-ENTMCNC: 32.8 GM/DL — SIGNIFICANT CHANGE UP (ref 32–36)
MCV RBC AUTO: 89.2 FL — SIGNIFICANT CHANGE UP (ref 80–100)
MONOCYTES # BLD AUTO: 0.9 K/UL — SIGNIFICANT CHANGE UP (ref 0–0.9)
MONOCYTES NFR BLD AUTO: 5.8 % — SIGNIFICANT CHANGE UP (ref 2–14)
NEUTROPHILS # BLD AUTO: 13.5 K/UL — HIGH (ref 1.8–7.4)
NEUTROPHILS NFR BLD AUTO: 84.7 % — HIGH (ref 43–77)
NITRITE UR-MCNC: NEGATIVE — SIGNIFICANT CHANGE UP
PH UR: 5 — SIGNIFICANT CHANGE UP (ref 5–8)
PLATELET # BLD AUTO: 150 K/UL — SIGNIFICANT CHANGE UP (ref 150–400)
POTASSIUM SERPL-MCNC: 4.2 MMOL/L — SIGNIFICANT CHANGE UP (ref 3.5–5.3)
POTASSIUM SERPL-SCNC: 4.2 MMOL/L — SIGNIFICANT CHANGE UP (ref 3.5–5.3)
PROT SERPL-MCNC: 7.4 GM/DL — SIGNIFICANT CHANGE UP (ref 6–8.3)
PROT UR-MCNC: 15 MG/DL
RAPID RVP RESULT: DETECTED
RBC # BLD: 5.08 M/UL — SIGNIFICANT CHANGE UP (ref 3.8–5.2)
RBC # FLD: 14.3 % — SIGNIFICANT CHANGE UP (ref 10.3–14.5)
RBC CASTS # UR COMP ASSIST: SIGNIFICANT CHANGE UP /HPF (ref 0–4)
RSV RNA SPEC QL NAA+PROBE: DETECTED
SODIUM SERPL-SCNC: 139 MMOL/L — SIGNIFICANT CHANGE UP (ref 135–145)
SP GR SPEC: 1 — LOW (ref 1.01–1.02)
UROBILINOGEN FLD QL: NEGATIVE MG/DL — SIGNIFICANT CHANGE UP
WBC # BLD: 15.9 K/UL — HIGH (ref 3.8–10.5)
WBC # FLD AUTO: 15.9 K/UL — HIGH (ref 3.8–10.5)
WBC UR QL: SIGNIFICANT CHANGE UP

## 2017-12-16 PROCEDURE — 99285 EMERGENCY DEPT VISIT HI MDM: CPT

## 2017-12-16 PROCEDURE — 93010 ELECTROCARDIOGRAM REPORT: CPT

## 2017-12-16 PROCEDURE — 71010: CPT | Mod: 26

## 2017-12-16 RX ORDER — ACETAMINOPHEN 500 MG
650 TABLET ORAL ONCE
Qty: 0 | Refills: 0 | Status: COMPLETED | OUTPATIENT
Start: 2017-12-16 | End: 2017-12-16

## 2017-12-16 RX ORDER — IPRATROPIUM/ALBUTEROL SULFATE 18-103MCG
3 AEROSOL WITH ADAPTER (GRAM) INHALATION ONCE
Qty: 0 | Refills: 0 | Status: COMPLETED | OUTPATIENT
Start: 2017-12-16 | End: 2017-12-16

## 2017-12-16 RX ORDER — HEPARIN SODIUM 5000 [USP'U]/ML
5000 INJECTION INTRAVENOUS; SUBCUTANEOUS EVERY 12 HOURS
Qty: 0 | Refills: 0 | Status: DISCONTINUED | OUTPATIENT
Start: 2017-12-16 | End: 2017-12-21

## 2017-12-16 RX ORDER — SODIUM CHLORIDE 9 MG/ML
1000 INJECTION INTRAMUSCULAR; INTRAVENOUS; SUBCUTANEOUS ONCE
Qty: 0 | Refills: 0 | Status: COMPLETED | OUTPATIENT
Start: 2017-12-16 | End: 2017-12-16

## 2017-12-16 RX ORDER — RAMIPRIL 5 MG
1 CAPSULE ORAL
Qty: 0 | Refills: 0 | COMMUNITY

## 2017-12-16 RX ORDER — MIRTAZAPINE 45 MG/1
1 TABLET, ORALLY DISINTEGRATING ORAL
Qty: 0 | Refills: 0 | COMMUNITY

## 2017-12-16 RX ORDER — SIMVASTATIN 20 MG/1
10 TABLET, FILM COATED ORAL AT BEDTIME
Qty: 0 | Refills: 0 | Status: DISCONTINUED | OUTPATIENT
Start: 2017-12-16 | End: 2017-12-21

## 2017-12-16 RX ORDER — ESCITALOPRAM OXALATE 10 MG/1
1 TABLET, FILM COATED ORAL
Qty: 0 | Refills: 0 | COMMUNITY

## 2017-12-16 RX ORDER — SODIUM CHLORIDE 9 MG/ML
1000 INJECTION INTRAMUSCULAR; INTRAVENOUS; SUBCUTANEOUS
Qty: 0 | Refills: 0 | Status: DISCONTINUED | OUTPATIENT
Start: 2017-12-16 | End: 2017-12-17

## 2017-12-16 RX ORDER — MEGESTROL ACETATE 40 MG/ML
625 SUSPENSION ORAL DAILY
Qty: 0 | Refills: 0 | Status: DISCONTINUED | OUTPATIENT
Start: 2017-12-16 | End: 2017-12-21

## 2017-12-16 RX ORDER — VANCOMYCIN HCL 1 G
1000 VIAL (EA) INTRAVENOUS ONCE
Qty: 0 | Refills: 0 | Status: COMPLETED | OUTPATIENT
Start: 2017-12-16 | End: 2017-12-16

## 2017-12-16 RX ORDER — AZTREONAM 2 G
250 VIAL (EA) INJECTION EVERY 8 HOURS
Qty: 0 | Refills: 0 | Status: DISCONTINUED | OUTPATIENT
Start: 2017-12-16 | End: 2017-12-17

## 2017-12-16 RX ORDER — ACETAMINOPHEN 500 MG
650 TABLET ORAL EVERY 6 HOURS
Qty: 0 | Refills: 0 | Status: DISCONTINUED | OUTPATIENT
Start: 2017-12-16 | End: 2017-12-21

## 2017-12-16 RX ORDER — ESCITALOPRAM OXALATE 10 MG/1
10 TABLET, FILM COATED ORAL DAILY
Qty: 0 | Refills: 0 | Status: DISCONTINUED | OUTPATIENT
Start: 2017-12-16 | End: 2017-12-21

## 2017-12-16 RX ORDER — MEGESTROL ACETATE 40 MG/ML
5 SUSPENSION ORAL
Qty: 0 | Refills: 0 | COMMUNITY

## 2017-12-16 RX ORDER — CEFEPIME 1 G/1
1000 INJECTION, POWDER, FOR SOLUTION INTRAMUSCULAR; INTRAVENOUS ONCE
Qty: 0 | Refills: 0 | Status: COMPLETED | OUTPATIENT
Start: 2017-12-16 | End: 2017-12-16

## 2017-12-16 RX ORDER — ASPIRIN/CALCIUM CARB/MAGNESIUM 324 MG
1 TABLET ORAL
Qty: 0 | Refills: 0 | COMMUNITY

## 2017-12-16 RX ORDER — MIRTAZAPINE 45 MG/1
15 TABLET, ORALLY DISINTEGRATING ORAL AT BEDTIME
Qty: 0 | Refills: 0 | Status: DISCONTINUED | OUTPATIENT
Start: 2017-12-16 | End: 2017-12-17

## 2017-12-16 RX ORDER — DOCUSATE SODIUM 100 MG
100 CAPSULE ORAL THREE TIMES A DAY
Qty: 0 | Refills: 0 | Status: DISCONTINUED | OUTPATIENT
Start: 2017-12-16 | End: 2017-12-21

## 2017-12-16 RX ORDER — SIMVASTATIN 20 MG/1
1 TABLET, FILM COATED ORAL
Qty: 0 | Refills: 0 | COMMUNITY

## 2017-12-16 RX ORDER — ASPIRIN/CALCIUM CARB/MAGNESIUM 324 MG
81 TABLET ORAL DAILY
Qty: 0 | Refills: 0 | Status: DISCONTINUED | OUTPATIENT
Start: 2017-12-16 | End: 2017-12-16

## 2017-12-16 RX ADMIN — MIRTAZAPINE 15 MILLIGRAM(S): 45 TABLET, ORALLY DISINTEGRATING ORAL at 23:47

## 2017-12-16 RX ADMIN — CEFEPIME 100 MILLIGRAM(S): 1 INJECTION, POWDER, FOR SOLUTION INTRAMUSCULAR; INTRAVENOUS at 15:45

## 2017-12-16 RX ADMIN — SODIUM CHLORIDE 1000 MILLILITER(S): 9 INJECTION INTRAMUSCULAR; INTRAVENOUS; SUBCUTANEOUS at 15:45

## 2017-12-16 RX ADMIN — Medication 3 MILLILITER(S): at 17:55

## 2017-12-16 RX ADMIN — Medication 3 MILLILITER(S): at 15:45

## 2017-12-16 RX ADMIN — Medication 650 MILLIGRAM(S): at 23:27

## 2017-12-16 RX ADMIN — SODIUM CHLORIDE 50 MILLILITER(S): 9 INJECTION INTRAMUSCULAR; INTRAVENOUS; SUBCUTANEOUS at 23:53

## 2017-12-16 RX ADMIN — Medication 250 MILLIGRAM(S): at 16:11

## 2017-12-16 RX ADMIN — SODIUM CHLORIDE 1000 MILLILITER(S): 9 INJECTION INTRAMUSCULAR; INTRAVENOUS; SUBCUTANEOUS at 22:27

## 2017-12-16 RX ADMIN — SODIUM CHLORIDE 1000 MILLILITER(S): 9 INJECTION INTRAMUSCULAR; INTRAVENOUS; SUBCUTANEOUS at 14:25

## 2017-12-16 RX ADMIN — Medication 650 MILLIGRAM(S): at 16:24

## 2017-12-16 RX ADMIN — SIMVASTATIN 10 MILLIGRAM(S): 20 TABLET, FILM COATED ORAL at 23:47

## 2017-12-16 NOTE — H&P ADULT - HISTORY OF PRESENT ILLNESS
84 yo F with PMH of HTN, dyslipidemia, h/o PNA, dementia, h/o questionable TIA, h/o diverticulitis, p/w cough. Patient is a poor historian due to dementia and doesn't appear to know where she is and why she came in. As per ED note patient came in with cough / fever / chills. Tested positive for RSV. In ED, patient was also treated for possible superimposed bacterial infection given recent hospitalization. Patient denies any symptoms at this time. Denies CP / SOB / weakness / myalgias / HA.     PSH: Denies   Social hx: Etoh - 1 glass of wine daily, Tobacco - quit 50 years ago, lives at home  Family Hx: Denies

## 2017-12-16 NOTE — H&P ADULT - ASSESSMENT
84 yo F with PMH of HTN, dyslipidemia, h/o PNA, dementia, h/o questionable TIA, h/o diverticulitis, p/w cough    *Sepsis 2/2 RSV infection with possible superimposed HCAP (suspect gram negative source 2/2 recent hospitalization)   -Questionable RLL infiltrate on CXR - final radiology report still pending. Will also get chest CT for further evaluation   -RVP pending  -Vanco / azreonem for now (allergic to PCN)  -Lactate  -Blood cx / Sputum cx  -Legionella / strep pneumo / mycoplasma testing  -ID consult for abx approval    *Decreased bicarb  -Possibly acidosis?  -Will get lactate level  -Recheck bicarb level in AM for improvement, after treatment of underlying infeciton    *MIGDALIA  -Possibly 2/2 sepsis vs dehydration  -Trend creatinine in AM for improvement after IVF  -Avoid nephrotoxic agents   -Hold ACEi    *HTN / dysliipdemia  -C/w home meds     *Patient doesn't know meds - med reconcilliation done using [facility] prescription refill history  -Will need to confirm meds in AM    *DVT ppx  -SCDs 84 yo F with PMH of HTN, dyslipidemia, h/o PNA, dementia, h/o questionable TIA, h/o diverticulitis, p/w cough      *Sepsis 2/2 RSV infection with possible superimposed HCAP (suspect gram negative source 2/2 recent hospitalization)   -Questionable RLL infiltrate on CXR - final radiology report still pending. Will also get chest CT for further evaluation   -Vanco / azreonem for now (allergic to PCN)  -Lactate  -Blood cx / Sputum cx  -Legionella / strep pneumo / mycoplasma testing  -ID consult for abx approval    *Decreased bicarb  -Possibly acidosis?  -Will get lactate level  -Recheck bicarb level in AM for improvement, after treatment of underlying infeciton    *MIGDALIA  -Possibly 2/2 sepsis vs dehydration  -Trend creatinine in AM for improvement after IVF  -Avoid nephrotoxic agents   -Hold ACEi    *HTN / dyslipdemia  -C/w home meds     *Patient doesn't know meds - med reconcilliation done using [facility] prescription refill history  -Will need to confirm meds in AM    *DVT ppx  -Heparin SubQ

## 2017-12-16 NOTE — ED PROVIDER NOTE - OBJECTIVE STATEMENT
84 y/o female with hx of dementia presents to the ED c/o cough x2days. +Fever +Chills. Pt had a flu shot this year. Pt was here one month ago s/p CVA and d/c after sx resolved.  Denies VIJAYA, CP. PCP- Dr. Rikki Petit

## 2017-12-16 NOTE — ED STATDOCS - PROGRESS NOTE DETAILS
84 y/o F with PMHx of CVA, hypotension, dementia presents to the ED c/o cough x2 days. Pt also reports feeling lethargic, fever, difficulty walking, myalgias, HA beginning today. No CP. Pt was given Robitussin. Pt had pneumonia and a vaginal infection last year. Pt will be sent to [facility] for further evaluation further workup. 84 y/o F with PMHx of CVA, hypotension, dementia presents to the ED c/o cough x2 days. Pt also reports feeling lethargic, fever, difficulty walking, myalgias, HA beginning today. No CP. Pt was given Robitussin. Pt had pneumonia and a vaginal infection last year. Pt is alert and oriented x2, which is baseline as per son. Pt will be sent to [facility] for further evaluation further workup.

## 2017-12-16 NOTE — ED PROVIDER NOTE - MEDICAL DECISION MAKING DETAILS
Plan for CXR, Labs, lactic acid. Likely admission for pneumonia as she is hypoxic and requiring supplemental oxygen.

## 2017-12-16 NOTE — ED ADULT TRIAGE NOTE - CHIEF COMPLAINT QUOTE
pts son states " my moms been coughing and lethargic lately, acting different" pt has baseline dementia. Pt herself denies all complaints

## 2017-12-16 NOTE — ED ADULT NURSE NOTE - CHPI ED SYMPTOMS NEG
no abdominal pain/no shortness of breath/no rash/no headache/no decreased eating/drinking/no diarrhea/no vomiting

## 2017-12-16 NOTE — H&P ADULT - NEUROLOGICAL DETAILS
responds to verbal commands/alert and awake, not oriented/responds to pain/sensation intact/deep reflexes intact/disoriented/cranial nerves intact/normal strength

## 2017-12-16 NOTE — ED PROVIDER NOTE - CARE PLAN
Principal Discharge DX:	Viral sepsis  Secondary Diagnosis:	Bronchospasm  Secondary Diagnosis:	MIGDALIA (acute kidney injury)

## 2017-12-17 LAB
ALBUMIN SERPL ELPH-MCNC: 2.4 G/DL — LOW (ref 3.3–5)
ALP SERPL-CCNC: 49 U/L — SIGNIFICANT CHANGE UP (ref 40–120)
ALT FLD-CCNC: 23 U/L — SIGNIFICANT CHANGE UP (ref 12–78)
ANION GAP SERPL CALC-SCNC: 9 MMOL/L — SIGNIFICANT CHANGE UP (ref 5–17)
AST SERPL-CCNC: 21 U/L — SIGNIFICANT CHANGE UP (ref 15–37)
BASOPHILS # BLD AUTO: 0 K/UL — SIGNIFICANT CHANGE UP (ref 0–0.2)
BASOPHILS NFR BLD AUTO: 0.4 % — SIGNIFICANT CHANGE UP (ref 0–2)
BILIRUB SERPL-MCNC: 0.8 MG/DL — SIGNIFICANT CHANGE UP (ref 0.2–1.2)
BUN SERPL-MCNC: 15 MG/DL — SIGNIFICANT CHANGE UP (ref 7–23)
CALCIUM SERPL-MCNC: 8 MG/DL — LOW (ref 8.5–10.1)
CHLORIDE SERPL-SCNC: 116 MMOL/L — HIGH (ref 96–108)
CO2 SERPL-SCNC: 21 MMOL/L — LOW (ref 22–31)
CREAT SERPL-MCNC: 0.87 MG/DL — SIGNIFICANT CHANGE UP (ref 0.5–1.3)
CULTURE RESULTS: SIGNIFICANT CHANGE UP
EOSINOPHIL # BLD AUTO: 0 K/UL — SIGNIFICANT CHANGE UP (ref 0–0.5)
EOSINOPHIL NFR BLD AUTO: 0.3 % — SIGNIFICANT CHANGE UP (ref 0–6)
GLUCOSE SERPL-MCNC: 97 MG/DL — SIGNIFICANT CHANGE UP (ref 70–99)
HCT VFR BLD CALC: 36.9 % — SIGNIFICANT CHANGE UP (ref 34.5–45)
HGB BLD-MCNC: 11.9 G/DL — SIGNIFICANT CHANGE UP (ref 11.5–15.5)
LACTATE SERPL-SCNC: 1.4 MMOL/L — SIGNIFICANT CHANGE UP (ref 0.7–2)
LEGIONELLA AG UR QL: NEGATIVE — SIGNIFICANT CHANGE UP
LYMPHOCYTES # BLD AUTO: 0.9 K/UL — LOW (ref 1–3.3)
LYMPHOCYTES # BLD AUTO: 7.9 % — LOW (ref 13–44)
MAGNESIUM SERPL-MCNC: 2.1 MG/DL — SIGNIFICANT CHANGE UP (ref 1.6–2.6)
MCHC RBC-ENTMCNC: 29.5 PG — SIGNIFICANT CHANGE UP (ref 27–34)
MCHC RBC-ENTMCNC: 32.4 GM/DL — SIGNIFICANT CHANGE UP (ref 32–36)
MCV RBC AUTO: 91 FL — SIGNIFICANT CHANGE UP (ref 80–100)
MONOCYTES # BLD AUTO: 0.3 K/UL — SIGNIFICANT CHANGE UP (ref 0–0.9)
MONOCYTES NFR BLD AUTO: 2.6 % — SIGNIFICANT CHANGE UP (ref 2–14)
NEUTROPHILS # BLD AUTO: 9.9 K/UL — HIGH (ref 1.8–7.4)
NEUTROPHILS NFR BLD AUTO: 88.8 % — HIGH (ref 43–77)
PHOSPHATE SERPL-MCNC: 2 MG/DL — LOW (ref 2.5–4.5)
PLATELET # BLD AUTO: 115 K/UL — LOW (ref 150–400)
POTASSIUM SERPL-MCNC: 3.8 MMOL/L — SIGNIFICANT CHANGE UP (ref 3.5–5.3)
POTASSIUM SERPL-SCNC: 3.8 MMOL/L — SIGNIFICANT CHANGE UP (ref 3.5–5.3)
PROT SERPL-MCNC: 5.9 GM/DL — LOW (ref 6–8.3)
RBC # BLD: 4.05 M/UL — SIGNIFICANT CHANGE UP (ref 3.8–5.2)
RBC # FLD: 14.9 % — HIGH (ref 10.3–14.5)
SODIUM SERPL-SCNC: 146 MMOL/L — HIGH (ref 135–145)
SPECIMEN SOURCE: SIGNIFICANT CHANGE UP
WBC # BLD: 11.1 K/UL — HIGH (ref 3.8–10.5)
WBC # FLD AUTO: 11.1 K/UL — HIGH (ref 3.8–10.5)

## 2017-12-17 PROCEDURE — 71250 CT THORAX DX C-: CPT | Mod: 26

## 2017-12-17 RX ORDER — POTASSIUM PHOSPHATE, MONOBASIC POTASSIUM PHOSPHATE, DIBASIC 236; 224 MG/ML; MG/ML
15 INJECTION, SOLUTION INTRAVENOUS ONCE
Qty: 0 | Refills: 0 | Status: COMPLETED | OUTPATIENT
Start: 2017-12-17 | End: 2017-12-17

## 2017-12-17 RX ORDER — SODIUM CHLORIDE 9 MG/ML
1000 INJECTION, SOLUTION INTRAVENOUS
Qty: 0 | Refills: 0 | Status: DISCONTINUED | OUTPATIENT
Start: 2017-12-17 | End: 2017-12-21

## 2017-12-17 RX ADMIN — HEPARIN SODIUM 5000 UNIT(S): 5000 INJECTION INTRAVENOUS; SUBCUTANEOUS at 18:07

## 2017-12-17 RX ADMIN — ESCITALOPRAM OXALATE 10 MILLIGRAM(S): 10 TABLET, FILM COATED ORAL at 12:09

## 2017-12-17 RX ADMIN — Medication 650 MILLIGRAM(S): at 18:18

## 2017-12-17 RX ADMIN — HEPARIN SODIUM 5000 UNIT(S): 5000 INJECTION INTRAVENOUS; SUBCUTANEOUS at 05:55

## 2017-12-17 RX ADMIN — MEGESTROL ACETATE 625 MILLIGRAM(S): 40 SUSPENSION ORAL at 12:09

## 2017-12-17 RX ADMIN — POTASSIUM PHOSPHATE, MONOBASIC POTASSIUM PHOSPHATE, DIBASIC 62.5 MILLIMOLE(S): 236; 224 INJECTION, SOLUTION INTRAVENOUS at 16:17

## 2017-12-17 RX ADMIN — Medication 50 MILLIGRAM(S): at 00:16

## 2017-12-17 RX ADMIN — Medication 50 MILLIGRAM(S): at 05:55

## 2017-12-17 NOTE — PROGRESS NOTE ADULT - SUBJECTIVE AND OBJECTIVE BOX
CC: cough (16 Dec 2017 19:29)    HPI:  84 yo F with PMH of HTN, dyslipidemia, PNA, dementia,  questionable TIA,  diverticulitis, p/w cough. Patient is a poor historian due to dementia and doesn't appear to know where she is and why she came in. As per ED note patient came in with cough / fever / chills. Tested positive for RSV. In ED, patient was also treated for possible superimposed bacterial infection given recent hospitalization. Patient denies any symptoms at this time. Denies CP / SOB / weakness / myalgias / HA.         INTERVAL HPI/OVERNIGHT EVENTS:    Vital Signs Last 24 Hrs  T(C): 37.7 (17 Dec 2017 11:29), Max: 38.3 (16 Dec 2017 23:03)  T(F): 99.9 (17 Dec 2017 11:29), Max: 101 (16 Dec 2017 23:03)  HR: 87 (17 Dec 2017 11:29) (78 - 100)  BP: 138/58 (17 Dec 2017 11:) (130/51 - 166/64)  BP(mean): --  RR: 20 (17 Dec 2017 11:29) (17 - 20)  SpO2: 100% (17 Dec 2017 11:29) (97% - 100%)  I&O's Detail    REVIEW OF SYSTEMS:    CONSTITUTIONAL: No weakness, fevers or chills  EYES/ENT: No visual changes;  No vertigo or throat pain   NECK: No pain or stiffness  RESPIRATORY: No cough, wheezing, hemoptysis; No shortness of breath  CARDIOVASCULAR: No chest pain or palpitations  GASTROINTESTINAL: No abdominal or epigastric pain. No nausea, vomiting, or hematemesis; No diarrhea or constipation. No melena or hematochezia.  GENITOURINARY: No dysuria, frequency or hematuria  NEUROLOGICAL: No numbness or weakness  SKIN: No itching, burning, rashes, or lesions   All other review of systems is negative unless indicated above.  PHYSICAL EXAM:    General: Well developed; well nourished; in no acute distress  Eyes: PERRLA, EOMI; conjunctiva and sclera clear  Head: Normocephalic; atraumatic  ENMT: No nasal discharge; airway clear  Neck: Supple; non tender; no masses  Respiratory: No wheezes, rales or rhonchi  Cardiovascular: Regular rate and rhythm. S1 and S2 Normal; No murmurs, gallops or rubs  Gastrointestinal: Soft non-tender non-distended; Normal bowel sounds  Genitourinary: No costovertebral angle tenderness  Extremities: Normal range of motion, No clubbing, cyanosis or edema  Vascular: Peripheral pulses palpable 2+ bilaterally  Neurological: Alert and oriented x4  Skin: Warm and dry. No acute rash  Lymph Nodes: No acute cervical adenopathy  Musculoskeletal: Normal gait, tone, without deformities  Psychiatric: Cooperative and appropriate      LABS:                     11.9   11.1  )-----------( 115      ( 17 Dec 2017 07:57 )             36.9     17 Dec 2017 07:57    146    |  116    |  15     ----------------------------<  97     3.8     |  21     |  0.87     Ca    8.0        17 Dec 2017 07:57  Phos  2.0       17 Dec 2017 07:57  Mg     2.1       17 Dec 2017 07:57    TPro  5.9    /  Alb  2.4    /  TBili  0.8    /  DBili  x      /  AST  21     /  ALT  23     /  AlkPhos  49     17 Dec 2017 07:57  LIVER FUNCTIONS - ( 17 Dec 2017 07:57 )  Alb: 2.4 g/dL / Pro: 5.9 gm/dL / ALK PHOS: 49 U/L / ALT: 23 U/L / AST: 21 U/L / GGT: x           Urinalysis Basic - ( 16 Dec 2017 17:25 )    Color: Yellow / Appearance: Clear / S.005 / pH: x  Gluc: x / Ketone: Negative  / Bili: Negative / Urobili: Negative mg/dL   Blood: x / Protein: 15 mg/dL / Nitrite: Negative   Leuk Esterase: Trace / RBC: 0-2 /HPF / WBC 0-2   Sq Epi: x / Non Sq Epi: Occasional / Bacteria: Moderate        MEDICATIONS  (STANDING):  escitalopram 10 milliGRAM(s) Oral daily  heparin  Injectable 5000 Unit(s) SubCutaneous every 12 hours  megestrol Suspension 625 milliGRAM(s) Oral daily  mirtazapine 15 milliGRAM(s) Oral at bedtime  simvastatin 10 milliGRAM(s) Oral at bedtime  sodium chloride 0.9%. 1000 milliLiter(s) (50 mL/Hr) IV Continuous <Continuous>    MEDICATIONS  (PRN):  acetaminophen   Tablet 650 milliGRAM(s) Oral every 6 hours PRN For Temp greater than 38 C (100.4 F)  docusate sodium 100 milliGRAM(s) Oral three times a day PRN Constipation      RADIOLOGY & ADDITIONAL TESTS:    EXAM:  CT CHEST                        PROCEDURE DATE:  2017      The thyroid gland is enlarged with multiple small coarse calcifications,   compatible with a thyroid goiter..    There is small bilateral pleural effusions with underlying atelectasis.   No focal consolidation..  The trachea and major bronchi are patent.    There is a prominent precarinal lymph node measuring up to 0.9 cm in   short axis. There are several additional small mediastinal and hilar   lymph nodes..  The heart size is normal.      Limited evaluation of the upper abdomen is unremarkable.      IMPRESSION: Small bilateral pleural effusions with underlying   atelectasis. No focal consolidation.. CC: cough (16 Dec 2017 19:29)    HPI:  84 yo F with PMH of HTN, dyslipidemia, PNA,  Advanced dementia,  questionable TIA,  diverticulitis, p/w cough  and SOB.  Patient is a poor historian due to dementia and doesn't appear to know where she is and why she came in. As per ED note patient came in with cough / fever / chills. Tested positive for RSV. In ED, patient was also treated for possible superimposed bacterial infection given recent hospitalization. Patient denies any symptoms at this time.         INTERVAL HPI/ OVERNIGHT EVENTS: Chart reviewed, Pt was seen and examined, confused and mildly lethargic. As  per daughter at bedside, Pt resides with son,  who brought Pt to ED due to SOB  and tactile fevers.  As per daughter Pt is more confused then usual.  Pt herself denies any complains. As per RN, Pt was awake and fed herself at breakfast, walked to the bathroom with assistance     Vital Signs Last 24 Hrs  T(C): 37.7 (17 Dec 2017 11:29), Max: 38.3 (16 Dec 2017 23:03)  T(F): 99.9 (17 Dec 2017 11:29), Max: 101 (16 Dec 2017 23:03)  HR: 87 (17 Dec 2017 11:29) (78 - 100)  BP: 138/58 (17 Dec 2017 11:29) (130/51 - 166/64)  RR: 20 (17 Dec 2017 11:29) (17 - 20)  SpO2: 100% (17 Dec 2017 11:29) (97% - 100%)    REVIEW OF SYSTEMS:  Unable to obtain due to confusion     PHYSICAL EXAM:  General: Well developed; well nourished; in no acute distress, lethargic but arousable   Eyes: PERRLA, EOMI; conjunctiva and sclera clear  Head: Normocephalic; atraumatic  ENMT: No nasal discharge; airway clear  Neck: + enlarged thyroid,  non tender  Respiratory: Decreased BS at bases,  No wheezes, rales or rhonchi  Cardiovascular: Regular rate and rhythm. S1 and S2 Normal; No murmurs  Gastrointestinal: Soft non-tender non-distended; Normal bowel sounds  Genitourinary: No costovertebral angle tenderness, some suprapubic fullness   Extremities: Preserved  range of motion, No  edema  Vascular: Peripheral pulses palpable 2+ bilaterally  Neurological: Alert and oriented x1, grossly non focal   Skin: Warm and dry. No acute rash  Lymph Nodes: No acute cervical adenopathy  Musculoskeletal: Normal muscle  tone, without deformities  Psychiatric: Cooperative      LABS:                     11.9   11.1  )-----------( 115      ( 17 Dec 2017 07:57 )             36.9     17 Dec 2017 07:57    146    |  116    |  15     ----------------------------<  97     3.8     |  21     |  0.87     Ca    8.0        17 Dec 2017 07:57  Phos  2.0       17 Dec 2017 07:57  Mg     2.1       17 Dec 2017 07:57    TPro  5.9    /  Alb  2.4    /  TBili  0.8    /  DBili  x      /  AST  21     /  ALT  23     /  AlkPhos  49     17 Dec 2017 07:57  LIVER FUNCTIONS - ( 17 Dec 2017 07:57 )  Alb: 2.4 g/dL / Pro: 5.9 gm/dL / ALK PHOS: 49 U/L / ALT: 23 U/L / AST: 21 U/L / GGT: x           Urinalysis Basic - ( 16 Dec 2017 17:25 )    Color: Yellow / Appearance: Clear / S.005 / pH: x  Gluc: x / Ketone: Negative  / Bili: Negative / Urobili: Negative mg/dL   Blood: x / Protein: 15 mg/dL / Nitrite: Negative   Leuk Esterase: Trace / RBC: 0-2 /HPF / WBC 0-2   Sq Epi: x / Non Sq Epi: Occasional / Bacteria: Moderate        MEDICATIONS  (STANDING):  escitalopram 10 milliGRAM(s) Oral daily  heparin  Injectable 5000 Unit(s) SubCutaneous every 12 hours  megestrol Suspension 625 milliGRAM(s) Oral daily  mirtazapine 15 milliGRAM(s) Oral at bedtime  simvastatin 10 milliGRAM(s) Oral at bedtime  sodium chloride 0.9%. 1000 milliLiter(s) (50 mL/Hr) IV Continuous <Continuous>    MEDICATIONS  (PRN):  acetaminophen   Tablet 650 milliGRAM(s) Oral every 6 hours PRN For Temp greater than 38 C (100.4 F)  docusate sodium 100 milliGRAM(s) Oral three times a day PRN Constipation      RADIOLOGY & ADDITIONAL TESTS:    EXAM:  CT CHEST                        PROCEDURE DATE:  2017      The thyroid gland is enlarged with multiple small coarse calcifications,   compatible with a thyroid goiter..    There is small bilateral pleural effusions with underlying atelectasis.   No focal consolidation..  The trachea and major bronchi are patent.    There is a prominent precarinal lymph node measuring up to 0.9 cm in   short axis. There are several additional small mediastinal and hilar   lymph nodes..  The heart size is normal.      Limited evaluation of the upper abdomen is unremarkable.      IMPRESSION: Small bilateral pleural effusions with underlying   atelectasis. No focal consolidation..

## 2017-12-17 NOTE — PROGRESS NOTE ADULT - ASSESSMENT
84 yo F with PMH of HTN, dyslipidemia, h/o PNA, dementia, h/o questionable TIA, h/o diverticulitis, p/w cough      *Sepsis 2/2 RSV infection with possible superimposed HCAP (suspect gram negative source 2/2 recent hospitalization)   -Questionable RLL infiltrate on CXR - final radiology report still pending. Will also get chest CT for further evaluation   -Vanco / azreonem for now (allergic to PCN)  -Lactate  -Blood cx / Sputum cx  -Legionella / strep pneumo / mycoplasma testing  -ID consult for abx approval    *Decreased bicarb  -Possibly acidosis?  -Will get lactate level  -Recheck bicarb level in AM for improvement, after treatment of underlying infeciton    *MIGDALIA  -Possibly 2/2 sepsis vs dehydration  -Trend creatinine in AM for improvement after IVF  -Avoid nephrotoxic agents   -Hold ACEi    *HTN / dyslipdemia  -C/w home meds     *Patient doesn't know meds - med reconcilliation done using [facility] prescription refill history  -Will need to confirm meds in AM    *DVT ppx  -Heparin SubQ 86 yo F with PMH of HTN, dyslipidemia, h/o PNA, dementia, h/o questionable TIA, h/o diverticulitis, p/w cough      *Sepsis 2/2 RSV infection unlikely  superimposed PNA   - fevers better, WBCs improved   -CT chest results d/w DR Church no evidence og PNA< will observe off ABxs   - F/u BCX and UCX   -Lactate improved   - C/w gentle IVF       * metabolic encephalopathy  - 2/2 sepsis, dehydration, and ARF and baseline dementia  - Supportive care  - C/w IVF   - Hold Remeron for now       *MIGDALIA  -2/2 sepsis and  dehydration  -C/w gentle IVF until oral intake better   -Hold ACEi  - Repeat labs in am     *HTN / dyslipdemia  -C/w home meds       *DVT ppx  -Heparin SubQ    Dispo: continue current care.

## 2017-12-18 LAB
ANION GAP SERPL CALC-SCNC: 9 MMOL/L — SIGNIFICANT CHANGE UP (ref 5–17)
BUN SERPL-MCNC: 17 MG/DL — SIGNIFICANT CHANGE UP (ref 7–23)
CALCIUM SERPL-MCNC: 8.1 MG/DL — LOW (ref 8.5–10.1)
CHLORIDE SERPL-SCNC: 117 MMOL/L — HIGH (ref 96–108)
CO2 SERPL-SCNC: 19 MMOL/L — LOW (ref 22–31)
CREAT SERPL-MCNC: 0.83 MG/DL — SIGNIFICANT CHANGE UP (ref 0.5–1.3)
GLUCOSE SERPL-MCNC: 106 MG/DL — HIGH (ref 70–99)
HCT VFR BLD CALC: 37.3 % — SIGNIFICANT CHANGE UP (ref 34.5–45)
HGB BLD-MCNC: 12.3 G/DL — SIGNIFICANT CHANGE UP (ref 11.5–15.5)
M PNEUMO IGM SER-ACNC: >2500 UNITS/ML — HIGH
MCHC RBC-ENTMCNC: 29.9 PG — SIGNIFICANT CHANGE UP (ref 27–34)
MCHC RBC-ENTMCNC: 33 GM/DL — SIGNIFICANT CHANGE UP (ref 32–36)
MCV RBC AUTO: 90.8 FL — SIGNIFICANT CHANGE UP (ref 80–100)
MYCOPLASMA AG SPEC QL: POSITIVE
PLATELET # BLD AUTO: 132 K/UL — LOW (ref 150–400)
POTASSIUM SERPL-MCNC: 3.5 MMOL/L — SIGNIFICANT CHANGE UP (ref 3.5–5.3)
POTASSIUM SERPL-SCNC: 3.5 MMOL/L — SIGNIFICANT CHANGE UP (ref 3.5–5.3)
RBC # BLD: 4.11 M/UL — SIGNIFICANT CHANGE UP (ref 3.8–5.2)
RBC # FLD: 14.8 % — HIGH (ref 10.3–14.5)
S PNEUM AG UR QL: NEGATIVE — SIGNIFICANT CHANGE UP
SODIUM SERPL-SCNC: 145 MMOL/L — SIGNIFICANT CHANGE UP (ref 135–145)
TSH SERPL-MCNC: 0.26 UIU/ML — LOW (ref 0.36–3.74)
WBC # BLD: 8.7 K/UL — SIGNIFICANT CHANGE UP (ref 3.8–10.5)
WBC # FLD AUTO: 8.7 K/UL — SIGNIFICANT CHANGE UP (ref 3.8–10.5)

## 2017-12-18 RX ORDER — ALBUTEROL 90 UG/1
2.5 AEROSOL, METERED ORAL EVERY 6 HOURS
Qty: 0 | Refills: 0 | Status: DISCONTINUED | OUTPATIENT
Start: 2017-12-18 | End: 2017-12-21

## 2017-12-18 RX ORDER — SODIUM CHLORIDE 0.65 %
2 AEROSOL, SPRAY (ML) NASAL
Qty: 0 | Refills: 0 | Status: DISCONTINUED | OUTPATIENT
Start: 2017-12-18 | End: 2017-12-21

## 2017-12-18 RX ADMIN — ALBUTEROL 2.5 MILLIGRAM(S): 90 AEROSOL, METERED ORAL at 19:40

## 2017-12-18 RX ADMIN — Medication 600 MILLIGRAM(S): at 15:53

## 2017-12-18 RX ADMIN — SIMVASTATIN 10 MILLIGRAM(S): 20 TABLET, FILM COATED ORAL at 21:07

## 2017-12-18 RX ADMIN — SODIUM CHLORIDE 50 MILLILITER(S): 9 INJECTION, SOLUTION INTRAVENOUS at 15:48

## 2017-12-18 RX ADMIN — ALBUTEROL 2.5 MILLIGRAM(S): 90 AEROSOL, METERED ORAL at 14:13

## 2017-12-18 RX ADMIN — HEPARIN SODIUM 5000 UNIT(S): 5000 INJECTION INTRAVENOUS; SUBCUTANEOUS at 17:17

## 2017-12-18 RX ADMIN — SIMVASTATIN 10 MILLIGRAM(S): 20 TABLET, FILM COATED ORAL at 04:26

## 2017-12-18 RX ADMIN — HEPARIN SODIUM 5000 UNIT(S): 5000 INJECTION INTRAVENOUS; SUBCUTANEOUS at 05:07

## 2017-12-18 RX ADMIN — MEGESTROL ACETATE 625 MILLIGRAM(S): 40 SUSPENSION ORAL at 15:53

## 2017-12-18 RX ADMIN — ESCITALOPRAM OXALATE 10 MILLIGRAM(S): 10 TABLET, FILM COATED ORAL at 11:14

## 2017-12-18 NOTE — PROGRESS NOTE ADULT - SUBJECTIVE AND OBJECTIVE BOX
CC: cough (16 Dec 2017 19:29)    HPI:  84 yo F with PMH of HTN, dyslipidemia, PNA,  Advanced dementia,  questionable TIA,  diverticulitis, p/w cough  and SOB.  Patient is a poor historian due to dementia and doesn't appear to know where she is and why she came in. As per ED note patient came in with cough / fever / chills. Tested positive for RSV. In ED, patient was also treated for possible superimposed bacterial infection given recent hospitalization. Patient denies any symptoms at this time.         INTERVAL HPI/ OVERNIGHT EVENTS: , Pt was seen and examined, Pt is awake, pleasantly confused, c/o feeling very weak and tired. As per Private aid, mental status better, close to baseline, has bad cough and unable to bring up phlegm.  Pt denies SOB or sore throat or difficulty swallowing       Vital Signs Last 24 Hrs  T(C): 36.8 (18 Dec 2017 17:43), Max: 37.4 (18 Dec 2017 05:16)  T(F): 98.3 (18 Dec 2017 17:43), Max: 99.3 (18 Dec 2017 05:16)  HR: 93 (18 Dec 2017 17:43) (75 - 93)  BP: 144/59 (18 Dec 2017 17:43) (137/49 - 169/69)  RR: 18 (18 Dec 2017 17:43) (18 - 18)  SpO2: 98% (18 Dec 2017 17:43) (97% - 100%)    REVIEW OF SYSTEMS:  Unable to obtain due to confusion     PHYSICAL EXAM:  General: Well developed; well nourished; in no acute distress  Eyes: PERRLA, EOMI; conjunctiva and sclera clear  Head: Normocephalic; atraumatic  ENMT: No nasal discharge; airway clear  Neck: + enlarged thyroid,  non tender  Respiratory: Decreased BS at bases,  + end expiratory wheezes  Cardiovascular: Regular rate and rhythm. S1 and S2 Normal; No murmurs  Gastrointestinal: Soft non-tender non-distended; Normal bowel sounds  Genitourinary: No costovertebral angle tenderness, some suprapubic fullness   Extremities: Preserved  range of motion, No  edema  Vascular: Peripheral pulses palpable 2+ bilaterally  Neurological: Alert and oriented x1, grossly non focal   Skin: Warm and dry. No acute rash  Lymph Nodes: No acute cervical adenopathy  Musculoskeletal: Normal muscle  tone, without deformities  Psychiatric: Cooperative      LABS:                         12.3   8.7   )-----------( 132      ( 18 Dec 2017 09:29 )             37.3     12-18    145  |  117<H>  |  17  ----------------------------<  106<H>  3.5   |  19<L>  |  0.83    Ca    8.1<L>      18 Dec 2017 09:29  Phos  2.0     12-  Mg     2.1     -    TPro  5.9<L>  /  Alb  2.4<L>  /  TBili  0.8  /  DBili  x   /  AST  21  /  ALT  23  /  AlkPhos  49      LIVER FUNCTIONS - ( 17 Dec 2017 07:57 )  Alb: 2.4 g/dL / Pro: 5.9 gm/dL / ALK PHOS: 49 U/L / ALT: 23 U/L / AST: 21 U/L / GGT: x               Urinalysis Basic - ( 16 Dec 2017 17:25 )    Color: Yellow / Appearance: Clear / S.005 / pH: x  Gluc: x / Ketone: Negative  / Bili: Negative / Urobili: Negative mg/dL   Blood: x / Protein: 15 mg/dL / Nitrite: Negative   Leuk Esterase: Trace / RBC: 0-2 /HPF / WBC 0-2   Sq Epi: x / Non Sq Epi: Occasional / Bacteria: Moderate    Culture - Blood (17 @ 07:57)    Specimen Source: .Blood None    Culture Results:   No growth to date.    Culture - Blood (17 @ 17:41)    Specimen Source: .Blood None    Culture Results:   No growth to date.    Culture - Urine (17 @ 17:25)    Specimen Source: .Urine Clean Catch (Midstream)    Culture Results:   <10,000 CFU/ml Normal Urogenital ángela present      MEDICATIONS  (STANDING):  ALBUTerol    0.083% 2.5 milliGRAM(s) Nebulizer every 6 hours  escitalopram 10 milliGRAM(s) Oral daily  guaiFENesin  milliGRAM(s) Oral every 12 hours  heparin  Injectable 5000 Unit(s) SubCutaneous every 12 hours  megestrol Suspension 625 milliGRAM(s) Oral daily  simvastatin 10 milliGRAM(s) Oral at bedtime  sodium chloride 0.45%. 1000 milliLiter(s) (50 mL/Hr) IV Continuous <Continuous>    MEDICATIONS  (PRN):  acetaminophen   Tablet 650 milliGRAM(s) Oral every 6 hours PRN For Temp greater than 38 C (100.4 F)  docusate sodium 100 milliGRAM(s) Oral three times a day PRN Constipation    MEDICATIONS  (PRN):  acetaminophen   Tablet 650 milliGRAM(s) Oral every 6 hours PRN For Temp greater than 38 C (100.4 F)  docusate sodium 100 milliGRAM(s) Oral three times a day PRN Constipation      RADIOLOGY & ADDITIONAL TESTS:    EXAM:  CT CHEST                        PROCEDURE DATE:  2017      The thyroid gland is enlarged with multiple small coarse calcifications,   compatible with a thyroid goiter..    There is small bilateral pleural effusions with underlying atelectasis.   No focal consolidation..  The trachea and major bronchi are patent.    There is a prominent precarinal lymph node measuring up to 0.9 cm in   short axis. There are several additional small mediastinal and hilar   lymph nodes..  The heart size is normal.      Limited evaluation of the upper abdomen is unremarkable.      IMPRESSION: Small bilateral pleural effusions with underlying   atelectasis. No focal consolidation..

## 2017-12-18 NOTE — PROGRESS NOTE ADULT - ASSESSMENT
84 yo F with PMH of HTN, dyslipidemia, h/o PNA, dementia, h/o questionable TIA, h/o diverticulitis, p/w cough      *Sepsis 2/2 RSV infection,  acute bronchitis likely viral   - low grade fevers,  WBCs WNL  -CT chest results :  no evidence of  PNA< will observe off ABxs   - F/u BCX and UCX neg  so far  -Lactate improved   - C/w gentle IVF   - Supportive care: C/w Neb TX RTC, Mucinex for cough, nasal saline       * metabolic encephalopathy  - 2/2 sepsis, dehydration, and ARF and baseline dementia  - Supportive care  - C/w IVF   - Hold Remeron for now       *MIGDALIA  -2/2 sepsis and  dehydration  -C/w gentle IVF until oral intake better   -Hold ACEi  - Repeat labs in am     *HTN / dyslipdemia  -C/w home meds       *DVT ppx  -Heparin SubQ    Dispo: continue current care. 84 yo F with PMH of HTN, dyslipidemia, h/o PNA, dementia, h/o questionable TIA, h/o diverticulitis, p/w cough      *Sepsis 2/2 RSV infection,  acute bronchitis likely viral   - low grade fevers,  WBCs WNL  -CT chest results :  no evidence of  PNA< will observe off ABxs   - F/u BCX and UCX neg  so far  -Lactate improved   - C/w gentle IVF   - Supportive care: C/w Neb TX RTC, Mucinex for cough, nasal saline       * metabolic encephalopathy  - 2/2 sepsis, dehydration, and ARF and baseline dementia  - Supportive care  - C/w IVF   - Hold Remeron for now       *MIGDALIA, resolved   -2/2 sepsis and  dehydration  -C/w gentle IVF until oral intake better   -Hold ACEi      *HTN / dyslipidemia  -C/w home meds     * abnormal TSh  - will check T3/T4      *DVT ppx  -Heparin SubQ    Dispo: continue current care, d/c planning

## 2017-12-19 LAB
ANION GAP SERPL CALC-SCNC: 11 MMOL/L — SIGNIFICANT CHANGE UP (ref 5–17)
APPEARANCE UR: CLEAR — SIGNIFICANT CHANGE UP
BILIRUB UR-MCNC: NEGATIVE — SIGNIFICANT CHANGE UP
BUN SERPL-MCNC: 11 MG/DL — SIGNIFICANT CHANGE UP (ref 7–23)
CALCIUM SERPL-MCNC: 8.1 MG/DL — LOW (ref 8.5–10.1)
CHLORIDE SERPL-SCNC: 114 MMOL/L — HIGH (ref 96–108)
CO2 SERPL-SCNC: 16 MMOL/L — LOW (ref 22–31)
COLOR SPEC: YELLOW — SIGNIFICANT CHANGE UP
CREAT SERPL-MCNC: 0.88 MG/DL — SIGNIFICANT CHANGE UP (ref 0.5–1.3)
DIFF PNL FLD: NEGATIVE — SIGNIFICANT CHANGE UP
GLUCOSE SERPL-MCNC: 101 MG/DL — HIGH (ref 70–99)
GLUCOSE UR QL: NEGATIVE MG/DL — SIGNIFICANT CHANGE UP
HCT VFR BLD CALC: 38.1 % — SIGNIFICANT CHANGE UP (ref 34.5–45)
HGB BLD-MCNC: 12.4 G/DL — SIGNIFICANT CHANGE UP (ref 11.5–15.5)
KETONES UR-MCNC: NEGATIVE — SIGNIFICANT CHANGE UP
LACTATE SERPL-SCNC: 2.2 MMOL/L — HIGH (ref 0.7–2)
LACTATE SERPL-SCNC: 2.4 MMOL/L — HIGH (ref 0.7–2)
LACTATE SERPL-SCNC: 3 MMOL/L — HIGH (ref 0.7–2)
LEUKOCYTE ESTERASE UR-ACNC: NEGATIVE — SIGNIFICANT CHANGE UP
MCHC RBC-ENTMCNC: 29.6 PG — SIGNIFICANT CHANGE UP (ref 27–34)
MCHC RBC-ENTMCNC: 32.6 GM/DL — SIGNIFICANT CHANGE UP (ref 32–36)
MCV RBC AUTO: 90.6 FL — SIGNIFICANT CHANGE UP (ref 80–100)
NITRITE UR-MCNC: NEGATIVE — SIGNIFICANT CHANGE UP
PH UR: 5 — SIGNIFICANT CHANGE UP (ref 5–8)
PLATELET # BLD AUTO: 178 K/UL — SIGNIFICANT CHANGE UP (ref 150–400)
POTASSIUM SERPL-MCNC: 3.7 MMOL/L — SIGNIFICANT CHANGE UP (ref 3.5–5.3)
POTASSIUM SERPL-SCNC: 3.7 MMOL/L — SIGNIFICANT CHANGE UP (ref 3.5–5.3)
PROT UR-MCNC: NEGATIVE MG/DL — SIGNIFICANT CHANGE UP
RBC # BLD: 4.21 M/UL — SIGNIFICANT CHANGE UP (ref 3.8–5.2)
RBC # FLD: 15 % — HIGH (ref 10.3–14.5)
SODIUM SERPL-SCNC: 141 MMOL/L — SIGNIFICANT CHANGE UP (ref 135–145)
SP GR SPEC: 1 — LOW (ref 1.01–1.02)
T3 SERPL-MCNC: 66 NG/DL — LOW (ref 80–200)
T4 AB SER-ACNC: 5.4 UG/DL — SIGNIFICANT CHANGE UP (ref 4.6–12)
UROBILINOGEN FLD QL: NEGATIVE MG/DL — SIGNIFICANT CHANGE UP
WBC # BLD: 9.4 K/UL — SIGNIFICANT CHANGE UP (ref 3.8–10.5)
WBC # FLD AUTO: 9.4 K/UL — SIGNIFICANT CHANGE UP (ref 3.8–10.5)

## 2017-12-19 PROCEDURE — 71010: CPT | Mod: 26

## 2017-12-19 RX ORDER — VANCOMYCIN HCL 1 G
1000 VIAL (EA) INTRAVENOUS ONCE
Qty: 0 | Refills: 0 | Status: COMPLETED | OUTPATIENT
Start: 2017-12-19 | End: 2017-12-19

## 2017-12-19 RX ORDER — AZTREONAM 2 G
1000 VIAL (EA) INJECTION ONCE
Qty: 0 | Refills: 0 | Status: COMPLETED | OUTPATIENT
Start: 2017-12-19 | End: 2017-12-19

## 2017-12-19 RX ORDER — AZTREONAM 2 G
1000 VIAL (EA) INJECTION EVERY 8 HOURS
Qty: 0 | Refills: 0 | Status: DISCONTINUED | OUTPATIENT
Start: 2017-12-19 | End: 2017-12-19

## 2017-12-19 RX ORDER — SODIUM CHLORIDE 9 MG/ML
1000 INJECTION INTRAMUSCULAR; INTRAVENOUS; SUBCUTANEOUS ONCE
Qty: 0 | Refills: 0 | Status: COMPLETED | OUTPATIENT
Start: 2017-12-19 | End: 2017-12-19

## 2017-12-19 RX ORDER — SODIUM CHLORIDE 9 MG/ML
1000 INJECTION INTRAMUSCULAR; INTRAVENOUS; SUBCUTANEOUS
Qty: 0 | Refills: 0 | Status: COMPLETED | OUTPATIENT
Start: 2017-12-19 | End: 2017-12-19

## 2017-12-19 RX ORDER — SODIUM CHLORIDE 9 MG/ML
1000 INJECTION INTRAMUSCULAR; INTRAVENOUS; SUBCUTANEOUS
Qty: 0 | Refills: 0 | Status: DISCONTINUED | OUTPATIENT
Start: 2017-12-19 | End: 2017-12-21

## 2017-12-19 RX ORDER — AZTREONAM 2 G
VIAL (EA) INJECTION
Qty: 0 | Refills: 0 | Status: DISCONTINUED | OUTPATIENT
Start: 2017-12-19 | End: 2017-12-19

## 2017-12-19 RX ADMIN — Medication 50 MILLIGRAM(S): at 07:22

## 2017-12-19 RX ADMIN — SIMVASTATIN 10 MILLIGRAM(S): 20 TABLET, FILM COATED ORAL at 21:47

## 2017-12-19 RX ADMIN — SODIUM CHLORIDE 50 MILLILITER(S): 9 INJECTION, SOLUTION INTRAVENOUS at 07:22

## 2017-12-19 RX ADMIN — Medication 600 MILLIGRAM(S): at 10:02

## 2017-12-19 RX ADMIN — SODIUM CHLORIDE 666.67 MILLILITER(S): 9 INJECTION INTRAMUSCULAR; INTRAVENOUS; SUBCUTANEOUS at 12:35

## 2017-12-19 RX ADMIN — SODIUM CHLORIDE 150 MILLILITER(S): 9 INJECTION INTRAMUSCULAR; INTRAVENOUS; SUBCUTANEOUS at 06:24

## 2017-12-19 RX ADMIN — SODIUM CHLORIDE 1000 MILLILITER(S): 9 INJECTION INTRAMUSCULAR; INTRAVENOUS; SUBCUTANEOUS at 06:47

## 2017-12-19 RX ADMIN — MEGESTROL ACETATE 625 MILLIGRAM(S): 40 SUSPENSION ORAL at 12:12

## 2017-12-19 RX ADMIN — ALBUTEROL 2.5 MILLIGRAM(S): 90 AEROSOL, METERED ORAL at 19:53

## 2017-12-19 RX ADMIN — HEPARIN SODIUM 5000 UNIT(S): 5000 INJECTION INTRAVENOUS; SUBCUTANEOUS at 05:35

## 2017-12-19 RX ADMIN — ALBUTEROL 2.5 MILLIGRAM(S): 90 AEROSOL, METERED ORAL at 07:46

## 2017-12-19 RX ADMIN — ALBUTEROL 2.5 MILLIGRAM(S): 90 AEROSOL, METERED ORAL at 14:10

## 2017-12-19 RX ADMIN — HEPARIN SODIUM 5000 UNIT(S): 5000 INJECTION INTRAVENOUS; SUBCUTANEOUS at 17:26

## 2017-12-19 RX ADMIN — ESCITALOPRAM OXALATE 10 MILLIGRAM(S): 10 TABLET, FILM COATED ORAL at 11:19

## 2017-12-19 RX ADMIN — Medication 250 MILLIGRAM(S): at 09:22

## 2017-12-19 RX ADMIN — Medication 650 MILLIGRAM(S): at 05:13

## 2017-12-19 RX ADMIN — Medication 600 MILLIGRAM(S): at 21:47

## 2017-12-19 RX ADMIN — Medication 600 MILLIGRAM(S): at 00:21

## 2017-12-19 RX ADMIN — Medication 650 MILLIGRAM(S): at 11:28

## 2017-12-19 NOTE — PROVIDER CONTACT NOTE (CHANGE IN STATUS NOTIFICATION) - ASSESSMENT
Patient with rectal temp of 103.9. Tylenol 650 given. Andrea Voss made aware. Stat CBC, BMP, blood cultures, lactate and chest xray obtained. NS @ 150 initiated.

## 2017-12-19 NOTE — CONSULT NOTE ADULT - ASSESSMENT
84 y/o Female with h/o HTN, dyslipidemia, prior PNA, dementia, h/o questionable TIA, diverticulitis was admitted on 12/16 with cough, fever and chills. Patient is a poor historian due to dementia and doesn't appear to know where she is and why she came in. In ER, she tested positive for RSV. She received levofloxacin.    1. URI with RSV. Acute bronchitis.  -no clinical or radiological evidence of pneumonia  -cultures are negative to date  -would hold off further abx therapy for now  -monitor temps  -f/u CBC  -supportive care  2. Other issues:   -care per medicine
84 yo F with PMH of HTN, dyslipidemia, h/o PNA, dementia, h/o questionable TIA, h/o diverticulitis, p/w cough. Patient is a poor historian due to dementia and doesn't appear to know where she is and why she came in. As per ED note patient came in with cough / fever / chills. In ER T to 100.4, wbc ct 15.9, xray (-), was given IV vancomycin/cefepime d/t concern for infection, had CT chest with b/l small pleural effusions/atelectasis, no infilrates or consolidation.     1. sepsis/RSV/viral syndrome/MIGDALIA  - improving, fevers are down, wbc ct to 11  - no evidence of superimposed pna   - recommend observing off antibiotics  - f/u cultures  - monitor temps  -tolerating abx well so far; no side effects noted.  -reason for abx use and side effects reviewed with patient  - f/u cbc    2. other issues - care per medicine

## 2017-12-19 NOTE — PROGRESS NOTE ADULT - SUBJECTIVE AND OBJECTIVE BOX
RN called to ruby. for Temp: 103.2 F RN called to ruby. for Temp: 103.2 F rectally. Pt is resting comfortably in bed. Confused since admission. As per RN no acute change in mental status noted. Pt denies headache, dizziness, chest pain or SOB at present. Pt is admitted for Sepsis 2/2 to viral infection. RVP panel +.   Vitals: Temp: 103.9 F rectally, BP: 176/86 mm hg, HR: 118, RR: 20, O2 sat: 96 %     PE:   General: resting comfortably in bed  Lungs: no wheezing or crackles  CVS: S1 S2 +, Tachycardia  Abdomen: soft, nt, nd  LE: no edema    A/P:    # Fever - admitted with sepsis 2/2 to viral infection RVP +  - pt is in 5E  - Stat CBC/BMP/Lactate/BC/UA/CXR and IVF  cc/hr ordered  - BC on admission : no growth to date so far  - Tylenol/Ice pack      Plan d/w Night OMAR RN called to ruby. for Temp: 103.2 F rectally. Pt is resting comfortably in bed. Confused since admission. As per RN no acute change in mental status noted. Pt denies headache, dizziness, chest pain or SOB at present. Pt is admitted for Sepsis 2/2 to viral infection. RVP panel +.   Vitals: Temp: 103.9 F rectally, BP: 176/86 mm hg, HR: 118, RR: 20, O2 sat: 96 %     PE:   General: resting comfortably in bed  Lungs: no wheezing or crackles  CVS: S1 S2 +, Tachycardia  Abdomen: soft, nt, nd  LE: no edema    A/P:    # Fever - admitted with sepsis 2/2 to viral infection RVP +, superimposed bacterial infection?  - pt is in 5E  - Stat CBC/BMP/Lactate/BC/UA/CXR and IVF  cc/hr ordered  - BC on admission : no growth to date so far  - Tylenol/Ice pack      Plan d/w Night RN    RN called to inform critical value Lactate 2.2 - 1L IVF NS bolus stat ordered and then c/w IVF NS gentle hydration. Repeat lactate after bolus.   Will give IV Aztreonam and IV Vanco x 1 dose stat. ID consult  Stat labs pending    Plan d/w Night OMAR RN called to ruby. for Temp: 103.2 F rectally. Pt is resting comfortably in bed. Confused since admission. As per RN no acute change in mental status noted. Pt denies headache, dizziness, chest pain or SOB at present. Pt is admitted for Sepsis 2/2 to viral infection. RVP panel +.   Vitals: Temp: 103.9 F rectally, BP: 176/86 mm hg, HR: 118, RR: 20, O2 sat: 96 %     PE:   General: resting comfortably in bed  Lungs: no wheezing or crackles  CVS: S1 S2 +, Tachycardia  Abdomen: soft, nt, nd  LE: no edema    A/P:    # Fever - admitted with sepsis 2/2 to viral infection RVP +, superimposed bacterial infection?  - pt is in 5E  - Stat CBC/BMP/Lactate/BC/UA/CXR and IVF  cc/hr ordered  - BC on admission : no growth to date so far  - Tylenol/Ice pack      Plan d/w Night RN    RN called to inform critical value Lactate 2.2 - 1L IVF NS bolus stat ordered and then c/w IVF NS gentle hydration. Repeat lactate after bolus.   Will give IV Aztreonam and IV Vanco x 1 dose stat. ID consult  Stat labs pending    Plan d/w Night RN  Call Hospitalist team to re assess pt after IVF NS bolus.

## 2017-12-19 NOTE — CONSULT NOTE ADULT - SUBJECTIVE AND OBJECTIVE BOX
Patient is a 85y old  Female who presents with a chief complaint of cough (16 Dec 2017 19:29)      HPI:  84 yo F with PMH of HTN, dyslipidemia, h/o PNA, dementia, h/o questionable TIA, h/o diverticulitis, p/w cough. Patient is a poor historian due to dementia and doesn't appear to know where she is and why she came in. As per ED note patient came in with cough / fever / chills. In ER T to 100.4, wbc ct 15.9, xray (-), was given IV vancomycin/cefepime d/t concern for infection, had CT chest with b/l small pleural effusions/atelectasis, no infilrates or consolidation.     PSH: Denies           PMH: as above    PSH: as above    Meds: per reconciliation sheet, noted below    MEDICATIONS  (STANDING):  escitalopram 10 milliGRAM(s) Oral daily  heparin  Injectable 5000 Unit(s) SubCutaneous every 12 hours  megestrol Suspension 625 milliGRAM(s) Oral daily  mirtazapine 15 milliGRAM(s) Oral at bedtime  simvastatin 10 milliGRAM(s) Oral at bedtime  sodium chloride 0.9%. 1000 milliLiter(s) (50 mL/Hr) IV Continuous <Continuous>      Allergies    azithromycin (Unknown)  penicillins (Other)    Intolerances        Social:  Etoh - 1 glass of wine daily, Tobacco - quit 50 years ago, lives at home no recent travel, no exposure to TB    Family history: NC      ROS: unable to obtain d/t medical condition     Vital Signs Last 24 Hrs  T(C): 37.3 (17 Dec 2017 05:30), Max: 38.3 (16 Dec 2017 23:03)  T(F): 99.1 (17 Dec 2017 05:30), Max: 101 (16 Dec 2017 23:03)  HR: 99 (17 Dec 2017 05:30) (78 - 103)  BP: 148/62 (17 Dec 2017 05:30) (130/51 - 166/64)  BP(mean): --  RR: 20 (17 Dec 2017 05:30) (16 - 20)  SpO2: 97% (17 Dec 2017 05:30) (97% - 99%)      PE:  Constitutional: frail looking  HEENT: NC/AT, EOMI, PERRLA  Neck: supple  Back: no tenderness  Respiratory: decreased breath sounds  Cardiovascular: S1S2 regular, no murmurs  Abdomen: soft, not tender, not distended, positive BS  Genitourinary: deferred  Rectal: deferred  Musculoskeletal: no muscle tenderness, no joint swelling or tenderness  Extremities: no pedal edema  Neurological:  no focal deficits  Skin: no rashes    Labs:                        11.9   11.1  )-----------( 115      ( 17 Dec 2017 07:57 )             36.9         146<H>  |  116<H>  |  15  ----------------------------<  97  3.8   |  21<L>  |  0.87    Ca    8.0<L>      17 Dec 2017 07:57  Phos  2.0       Mg     2.1         TPro  5.9<L>  /  Alb  2.4<L>  /  TBili  0.8  /  DBili  x   /  AST  21  /  ALT  23  /  AlkPhos  49       LIVER FUNCTIONS - ( 17 Dec 2017 07:57 )  Alb: 2.4 g/dL / Pro: 5.9 gm/dL / ALK PHOS: 49 U/L / ALT: 23 U/L / AST: 21 U/L / GGT: x           Urinalysis Basic - ( 16 Dec 2017 17:25 )    Color: Yellow / Appearance: Clear / S.005 / pH: x  Gluc: x / Ketone: Negative  / Bili: Negative / Urobili: Negative mg/dL   Blood: x / Protein: 15 mg/dL / Nitrite: Negative   Leuk Esterase: Trace / RBC: 0-2 /HPF / WBC 0-2   Sq Epi: x / Non Sq Epi: Occasional / Bacteria: Moderate            Radiology:  < from: CT Chest No Cont (17 @ 08:54) >  EXAM:  CT CHEST                            PROCEDURE DATE:  2017          INTERPRETATION:  Exam Date: 2017 8:54 AM    CT chest without contrast    CLINICAL INFORMATION:  p/w cough / fever - possible PNA?    TECHNIQUE:  Contiguous axial 3 mm sections were obtained through the   chest using single helical acquisition.  In addition, 2D sagittal and   coronal reformatted images obtained.   This scan was performed using   automatic exposure control (radiation dose reduction software) to obtain   a diagnostic image quality scan with patient dose as low as reasonably   achievable.        FINDINGS:   No previous examinations are available for review.    The thyroid gland is enlarged with multiple small coarse calcifications,   compatible with a thyroid goiter..    There is small bilateral pleural effusions with underlying atelectasis.   No focal consolidation..  The trachea and major bronchi are patent.    There is a prominent precarinal lymph node measuring up to 0.9 cm in   short axis. There are several additional small mediastinal and hilar   lymph nodes..  The heart size is normal.      Limited evaluation of the upper abdomen is unremarkable.      IMPRESSION: Small bilateral pleural effusions with underlying   atelectasis. No focal consolidation..          Advanced directives addressed: full resuscitation
Patient is a 85y old  Female who presents with a chief complaint of cough (16 Dec 2017 19:29)    HPI:  86 y/o Female with h/o HTN, dyslipidemia, prior PNA, dementia, h/o questionable TIA, diverticulitis was admitted on 12/16 with cough, fever and chills. Patient is a poor historian due to dementia and doesn't appear to know where she is and why she came in. In ER, she tested positive for RSV. She received levofloxacin.    PMH: as above  PSH: as above  Meds: per reconciliation sheet, noted below  MEDICATIONS  (STANDING):  ALBUTerol    0.083% 2.5 milliGRAM(s) Nebulizer every 6 hours  escitalopram 10 milliGRAM(s) Oral daily  guaiFENesin  milliGRAM(s) Oral every 12 hours  heparin  Injectable 5000 Unit(s) SubCutaneous every 12 hours  levoFLOXacin IVPB      megestrol Suspension 625 milliGRAM(s) Oral daily  simvastatin 10 milliGRAM(s) Oral at bedtime  sodium chloride 0.45%. 1000 milliLiter(s) (50 mL/Hr) IV Continuous <Continuous>  sodium chloride 0.9%. 1000 milliLiter(s) (1000 mL/Hr) IV Continuous <Continuous>    MEDICATIONS  (PRN):  acetaminophen   Tablet 650 milliGRAM(s) Oral every 6 hours PRN For Temp greater than 38 C (100.4 F)  docusate sodium 100 milliGRAM(s) Oral three times a day PRN Constipation  sodium chloride 0.65% Nasal 2 Spray(s) Both Nostrils every 1 hour PRN Congestion    Allergies    azithromycin (Unknown)  penicillins (Other)    Intolerances      Social: prior smoking, no alcohol, no illegal drugs; no recent travel, no exposure to TB  FAMILY HISTORY:    ROS: the patient denies HA, no dizziness, no sore throat, no blurry vision, no CP, no palpitations, no SOB, has cough, no abdominal pain, no diarrhea, no N/V, no dysuria, no leg pain, no claudication, no rash, no joint aches, no rectal pain or bleeding, no night sweats    Vital Signs Last 24 Hrs  T(C): 38.6 (19 Dec 2017 11:24), Max: 39.9 (19 Dec 2017 05:11)  T(F): 101.4 (19 Dec 2017 11:24), Max: 103.9 (19 Dec 2017 05:11)  HR: 103 (19 Dec 2017 11:30) (81 - 118)  BP: 140/58 (19 Dec 2017 11:30) (113/47 - 176/68)  BP(mean): --  RR: 20 (19 Dec 2017 11:30) (18 - 20)  SpO2: 96% (19 Dec 2017 11:30) (94% - 98%)  Daily     Daily     PE:    Constitutional: frail looking  HEENT: NC/AT, EOMI, PERRLA  Neck: supple  Back: no tenderness  Respiratory: few crackles at bases  Cardiovascular: S1S2 regular, no murmurs  Abdomen: soft, not tender, not distended, positive BS  Genitourinary: no LN palpable  Rectal: deferred  Musculoskeletal: no muscle tenderness, no joint swelling or tenderness  Extremities: no pedal edema  Neurological: AxOx3, moving all extremities  Skin: no rashes    Labs:                        12.4   9.4   )-----------( 178      ( 19 Dec 2017 05:46 )             38.1     12-19    141  |  114<H>  |  11  ----------------------------<  101<H>  3.7   |  16<L>  |  0.88    Ca    8.1<L>      19 Dec 2017 05:46      Culture - Blood (collected 17 Dec 2017 07:57)  Source: .Blood None  Preliminary Report (18 Dec 2017 13:02):    No growth to date.    Culture - Blood (collected 16 Dec 2017 17:41)  Source: .Blood None  Preliminary Report (18 Dec 2017 01:02):    No growth to date.    Culture - Urine (collected 16 Dec 2017 17:25)  Source: .Urine Clean Catch (Midstream)  Final Report (17 Dec 2017 22:06):    <10,000 CFU/ml Normal Urogenital ángela present    Culture - Blood (collected 16 Dec 2017 14:43)  Source: .Blood None  Preliminary Report (17 Dec 2017 20:01):    No growth to date.      Radiology:    < from: CT Chest No Cont (12.17.17 @ 08:54) >  Small bilateral pleural effusions with underlying   atelectasis. No focal consolidation..      < end of copied text >      Advanced directives addressed: full resuscitation

## 2017-12-19 NOTE — PROGRESS NOTE ADULT - ASSESSMENT
84 yo F with PMH of HTN, dyslipidemia, h/o PNA, dementia, h/o questionable TIA, h/o diverticulitis, p/w cough      *Sepsis 2/2 RSV infection,  acute bronchitis likely viral   - low grade fevers,  WBCs WNL  -CT chest results :  no evidence of  PNA< will observe off ABxs   - F/u BCX and UCX neg  so far  -Lactate improved   - C/w gentle IVF   - Supportive care: C/w Neb TX RTC, Mucinex for cough, nasal saline       * metabolic encephalopathy  - 2/2 sepsis, dehydration, and ARF and baseline dementia  - Supportive care  - C/w IVF   - Hold Remeron for now       *MIGDALIA, resolved   -2/2 sepsis and  dehydration  -C/w gentle IVF until oral intake better   -Hold ACEi      *HTN / dyslipidemia  -C/w home meds     * abnormal TSh  - will check T3/T4      *DVT ppx  -Heparin SubQ    Dispo: continue current care, d/c planning 86 yo F with PMH of HTN, dyslipidemia, h/o PNA, dementia, h/o questionable TIA, h/o diverticulitis, p/w cough      *Sepsis 2/2 RSV infection,  acute bronchitis likely viral   -high fever last night, sepsis work up was initiated, Pt was given IVF bolus and IVF     - Pt was given dose of Aztreonam and VAnco  - Repeat CXR neg for PNA   - Repeat UA neg  - Repeat BCX sent   -Lactate improved   - C/w gentle IVF   - Supportive care: C/w Neb TX RTC, Mucinex for cough, nasal saline   - Monitor for other signs of infection   - ABxs changed to Levaquin due to + Mycoplasma   - Will further d/w ID     *  Lactic acidosis,   - suspect sepsis due to Viral  syndrome  - will give another 1000ml of NS c/w  gentle hydration     * metabolic encephalopathy  - 2/2 sepsis, dehydration, and ARF and baseline dementia  - Supportive care  - C/w IVF   - Hold Remeron for now       *MIGDALIA, resolved   -2/2 sepsis and  dehydration  -C/w gentle IVF until oral intake better   -Hold ACEi      *HTN / dyslipidemia  -C/w home meds     * abnormal TFT's   - TSH Low, T3 Low, Normal free T4  - due to sick euthyroid       *DVT ppx  -Heparin SubQ    Dispo: monitor closely, c/w current management

## 2017-12-19 NOTE — PROGRESS NOTE ADULT - SUBJECTIVE AND OBJECTIVE BOX
CC: cough (16 Dec 2017 19:29)    HPI:  86 yo F with PMH of HTN, dyslipidemia, PNA,  Advanced dementia,  questionable TIA,  diverticulitis, p/w cough  and SOB.  Patient is a poor historian due to dementia and doesn't appear to know where she is and why she came in. As per ED note patient came in with cough / fever / chills. Tested positive for RSV. In ED, patient was also treated for possible superimposed bacterial infection given recent hospitalization. Patient denies any symptoms at this time.         INTERVAL HPI/ OVERNIGHT EVENTS: , Pt was seen and examined, Pt is awake, pleasantly confused, c/o feeling very weak and tired. As per Private aid, mental status better, close to baseline, has bad cough and unable to bring up phlegm.  Pt denies SOB or sore throat or difficulty swallowing       Vital Signs Last 24 Hrs  T(C): 36.8 (18 Dec 2017 17:43), Max: 37.4 (18 Dec 2017 05:16)  T(F): 98.3 (18 Dec 2017 17:43), Max: 99.3 (18 Dec 2017 05:16)  HR: 93 (18 Dec 2017 17:43) (75 - 93)  BP: 144/59 (18 Dec 2017 17:43) (137/49 - 169/69)  RR: 18 (18 Dec 2017 17:43) (18 - 18)  SpO2: 98% (18 Dec 2017 17:43) (97% - 100%)    REVIEW OF SYSTEMS:  Unable to obtain due to confusion     PHYSICAL EXAM:  General: Well developed; well nourished; in no acute distress  Eyes: PERRLA, EOMI; conjunctiva and sclera clear  Head: Normocephalic; atraumatic  ENMT: No nasal discharge; airway clear  Neck: + enlarged thyroid,  non tender  Respiratory: Decreased BS at bases,  + end expiratory wheezes  Cardiovascular: Regular rate and rhythm. S1 and S2 Normal; No murmurs  Gastrointestinal: Soft non-tender non-distended; Normal bowel sounds  Genitourinary: No costovertebral angle tenderness, some suprapubic fullness   Extremities: Preserved  range of motion, No  edema  Vascular: Peripheral pulses palpable 2+ bilaterally  Neurological: Alert and oriented x1, grossly non focal   Skin: Warm and dry. No acute rash  Lymph Nodes: No acute cervical adenopathy  Musculoskeletal: Normal muscle  tone, without deformities  Psychiatric: Cooperative      LABS:                         12.3   8.7   )-----------( 132      ( 18 Dec 2017 09:29 )             37.3     12-18    145  |  117<H>  |  17  ----------------------------<  106<H>  3.5   |  19<L>  |  0.83    Ca    8.1<L>      18 Dec 2017 09:29  Phos  2.0     12-  Mg     2.1     -    TPro  5.9<L>  /  Alb  2.4<L>  /  TBili  0.8  /  DBili  x   /  AST  21  /  ALT  23  /  AlkPhos  49      LIVER FUNCTIONS - ( 17 Dec 2017 07:57 )  Alb: 2.4 g/dL / Pro: 5.9 gm/dL / ALK PHOS: 49 U/L / ALT: 23 U/L / AST: 21 U/L / GGT: x               Urinalysis Basic - ( 16 Dec 2017 17:25 )    Color: Yellow / Appearance: Clear / S.005 / pH: x  Gluc: x / Ketone: Negative  / Bili: Negative / Urobili: Negative mg/dL   Blood: x / Protein: 15 mg/dL / Nitrite: Negative   Leuk Esterase: Trace / RBC: 0-2 /HPF / WBC 0-2   Sq Epi: x / Non Sq Epi: Occasional / Bacteria: Moderate    Culture - Blood (17 @ 07:57)    Specimen Source: .Blood None    Culture Results:   No growth to date.    Culture - Blood (17 @ 17:41)    Specimen Source: .Blood None    Culture Results:   No growth to date.    Culture - Urine (17 @ 17:25)    Specimen Source: .Urine Clean Catch (Midstream)    Culture Results:   <10,000 CFU/ml Normal Urogenital ángela present      MEDICATIONS  (STANDING):  ALBUTerol    0.083% 2.5 milliGRAM(s) Nebulizer every 6 hours  escitalopram 10 milliGRAM(s) Oral daily  guaiFENesin  milliGRAM(s) Oral every 12 hours  heparin  Injectable 5000 Unit(s) SubCutaneous every 12 hours  megestrol Suspension 625 milliGRAM(s) Oral daily  simvastatin 10 milliGRAM(s) Oral at bedtime  sodium chloride 0.45%. 1000 milliLiter(s) (50 mL/Hr) IV Continuous <Continuous>    MEDICATIONS  (PRN):  acetaminophen   Tablet 650 milliGRAM(s) Oral every 6 hours PRN For Temp greater than 38 C (100.4 F)  docusate sodium 100 milliGRAM(s) Oral three times a day PRN Constipation    MEDICATIONS  (PRN):  acetaminophen   Tablet 650 milliGRAM(s) Oral every 6 hours PRN For Temp greater than 38 C (100.4 F)  docusate sodium 100 milliGRAM(s) Oral three times a day PRN Constipation      RADIOLOGY & ADDITIONAL TESTS:    EXAM:  CT CHEST                        PROCEDURE DATE:  2017      The thyroid gland is enlarged with multiple small coarse calcifications,   compatible with a thyroid goiter..    There is small bilateral pleural effusions with underlying atelectasis.   No focal consolidation..  The trachea and major bronchi are patent.    There is a prominent precarinal lymph node measuring up to 0.9 cm in   short axis. There are several additional small mediastinal and hilar   lymph nodes..  The heart size is normal.      Limited evaluation of the upper abdomen is unremarkable.      IMPRESSION: Small bilateral pleural effusions with underlying   atelectasis. No focal consolidation.. CC: cough (16 Dec 2017 19:29)    HPI:  86 yo F with PMH of HTN, dyslipidemia, PNA,  Advanced dementia,  questionable TIA,  diverticulitis, p/w cough  and SOB.  Patient is a poor historian due to dementia and doesn't appear to know where she is and why she came in. As per ED note patient came in with cough / fever / chills. Tested positive for RSV. In ED, patient was also treated for possible superimposed bacterial infection given recent hospitalization. Patient denies any symptoms at this time.         INTERVAL HPI/ OVERNIGHT EVENTS: , Pt was seen and examined,  awake and alert, baseline confuse, reports not feeling well, but unable to provide details. Denies abd pain, SOB or CP. Overnight events noted.     Vital Signs Last 24 Hrs  reviewed, had max  Temp of 103. BP stable     REVIEW OF SYSTEMS:  Unable to obtain due to confusion     PHYSICAL EXAM:  General: Well developed; well nourished; in no acute distress  Eyes: PERRLA, EOMI; conjunctiva and sclera clear  Head: Normocephalic; atraumatic  ENMT: No nasal discharge; airway clear  Neck: + enlarged thyroid,  non tender  Respiratory: Decreased BS at bases,  + end expiratory wheezes  Cardiovascular: Regular rate and rhythm. S1 and S2 Normal; No murmurs  Gastrointestinal: Soft non-tender non-distended; Normal bowel sounds  Genitourinary: No costovertebral angle tenderness  Extremities: Preserved  range of motion, No  edema  Vascular: Peripheral pulses palpable 2+ bilaterally  Neurological: Alert and oriented x1, grossly non focal   Skin: Warm and dry. No acute rash  Lymph Nodes: No acute cervical adenopathy  Musculoskeletal: Normal muscle  tone, without deformities  Psychiatric: Cooperative      LABS:                         12.3   8.7   )-----------( 132      ( 18 Dec 2017 09:29 )             37.3     12-18    145  |  117<H>  |  17  ----------------------------<  106<H>  3.5   |  19<L>  |  0.83    Ca    8.1<L>      18 Dec 2017 09:29  Phos  2.0     12-17  Mg     2.1     12-17    TPro  5.9<L>  /  Alb  2.4<L>  /  TBili  0.8  /  DBili  x   /  AST  21  /  ALT  23  /  AlkPhos  49  12-17    LIVER FUNCTIONS - ( 17 Dec 2017 07:57 )  Alb: 2.4 g/dL / Pro: 5.9 gm/dL / ALK PHOS: 49 U/L / ALT: 23 U/L / AST: 21 U/L / GGT: x               Urinalysis Basic - ( 16 Dec 2017 17:25 )    Color: Yellow / Appearance: Clear / S.005 / pH: x  Gluc: x / Ketone: Negative  / Bili: Negative / Urobili: Negative mg/dL   Blood: x / Protein: 15 mg/dL / Nitrite: Negative   Leuk Esterase: Trace / RBC: 0-2 /HPF / WBC 0-2   Sq Epi: x / Non Sq Epi: Occasional / Bacteria: Moderate    Culture - Blood (17 @ 07:57)    Specimen Source: .Blood None    Culture Results:   No growth to date.    Culture - Blood (17 @ 17:41)    Specimen Source: .Blood None    Culture Results:   No growth to date.    Culture - Urine (17 @ 17:25)    Specimen Source: .Urine Clean Catch (Midstream)    Culture Results:   <10,000 CFU/ml Normal Urogenital ángela present    Meds reviewed       RADIOLOGY & ADDITIONAL TESTS:    EXAM:  CT CHEST                        PROCEDURE DATE:  2017      The thyroid gland is enlarged with multiple small coarse calcifications,   compatible with a thyroid goiter..    There is small bilateral pleural effusions with underlying atelectasis.   No focal consolidation..  The trachea and major bronchi are patent.    There is a prominent precarinal lymph node measuring up to 0.9 cm in   short axis. There are several additional small mediastinal and hilar   lymph nodes..  The heart size is normal.      Limited evaluation of the upper abdomen is unremarkable.      IMPRESSION: Small bilateral pleural effusions with underlying   atelectasis. No focal consolidation..

## 2017-12-20 LAB
ANION GAP SERPL CALC-SCNC: 8 MMOL/L — SIGNIFICANT CHANGE UP (ref 5–17)
BUN SERPL-MCNC: 8 MG/DL — SIGNIFICANT CHANGE UP (ref 7–23)
CALCIUM SERPL-MCNC: 8.6 MG/DL — SIGNIFICANT CHANGE UP (ref 8.5–10.1)
CHLORIDE SERPL-SCNC: 116 MMOL/L — HIGH (ref 96–108)
CO2 SERPL-SCNC: 19 MMOL/L — LOW (ref 22–31)
CREAT SERPL-MCNC: 0.76 MG/DL — SIGNIFICANT CHANGE UP (ref 0.5–1.3)
GLUCOSE SERPL-MCNC: 91 MG/DL — SIGNIFICANT CHANGE UP (ref 70–99)
HCT VFR BLD CALC: 31.4 % — LOW (ref 34.5–45)
HGB BLD-MCNC: 10.2 G/DL — LOW (ref 11.5–15.5)
LACTATE SERPL-SCNC: 0.7 MMOL/L — SIGNIFICANT CHANGE UP (ref 0.7–2)
MCHC RBC-ENTMCNC: 29.8 PG — SIGNIFICANT CHANGE UP (ref 27–34)
MCHC RBC-ENTMCNC: 32.5 GM/DL — SIGNIFICANT CHANGE UP (ref 32–36)
MCV RBC AUTO: 91.7 FL — SIGNIFICANT CHANGE UP (ref 80–100)
PLATELET # BLD AUTO: 176 K/UL — SIGNIFICANT CHANGE UP (ref 150–400)
POTASSIUM SERPL-MCNC: 3.9 MMOL/L — SIGNIFICANT CHANGE UP (ref 3.5–5.3)
POTASSIUM SERPL-SCNC: 3.9 MMOL/L — SIGNIFICANT CHANGE UP (ref 3.5–5.3)
RBC # BLD: 3.43 M/UL — LOW (ref 3.8–5.2)
RBC # FLD: 15.3 % — HIGH (ref 10.3–14.5)
SODIUM SERPL-SCNC: 143 MMOL/L — SIGNIFICANT CHANGE UP (ref 135–145)
WBC # BLD: 14 K/UL — HIGH (ref 3.8–10.5)
WBC # FLD AUTO: 14 K/UL — HIGH (ref 3.8–10.5)

## 2017-12-20 RX ORDER — AZITHROMYCIN 500 MG/1
500 TABLET, FILM COATED ORAL DAILY
Qty: 0 | Refills: 0 | Status: DISCONTINUED | OUTPATIENT
Start: 2017-12-20 | End: 2017-12-21

## 2017-12-20 RX ADMIN — Medication 600 MILLIGRAM(S): at 05:45

## 2017-12-20 RX ADMIN — ALBUTEROL 2.5 MILLIGRAM(S): 90 AEROSOL, METERED ORAL at 01:46

## 2017-12-20 RX ADMIN — HEPARIN SODIUM 5000 UNIT(S): 5000 INJECTION INTRAVENOUS; SUBCUTANEOUS at 17:49

## 2017-12-20 RX ADMIN — SODIUM CHLORIDE 50 MILLILITER(S): 9 INJECTION, SOLUTION INTRAVENOUS at 23:15

## 2017-12-20 RX ADMIN — ALBUTEROL 2.5 MILLIGRAM(S): 90 AEROSOL, METERED ORAL at 08:20

## 2017-12-20 RX ADMIN — SIMVASTATIN 10 MILLIGRAM(S): 20 TABLET, FILM COATED ORAL at 23:15

## 2017-12-20 RX ADMIN — Medication 600 MILLIGRAM(S): at 17:48

## 2017-12-20 RX ADMIN — MEGESTROL ACETATE 625 MILLIGRAM(S): 40 SUSPENSION ORAL at 17:48

## 2017-12-20 RX ADMIN — ESCITALOPRAM OXALATE 10 MILLIGRAM(S): 10 TABLET, FILM COATED ORAL at 11:51

## 2017-12-20 RX ADMIN — AZITHROMYCIN 500 MILLIGRAM(S): 500 TABLET, FILM COATED ORAL at 18:14

## 2017-12-20 RX ADMIN — HEPARIN SODIUM 5000 UNIT(S): 5000 INJECTION INTRAVENOUS; SUBCUTANEOUS at 05:46

## 2017-12-20 NOTE — PROGRESS NOTE ADULT - ASSESSMENT
84 yo F with PMH of HTN, dyslipidemia, h/o PNA, dementia, h/o questionable TIA, h/o diverticulitis, p/w cough. Patient is a poor historian due to dementia and doesn't appear to know where she is and why she came in. As per ED note patient came in with cough / fever / chills. In ER T to 100.4, wbc ct 15.9, xray (-), was given IV vancomycin/cefepime d/t concern for infection, had CT chest with b/l small pleural effusions/atelectasis, no infilrates or consolidation.     1. sepsis/RSV/viral syndrome/mycoplasma pna  -still w/ fevers/cough/elevated wbc ct 14  - mycoplasma IgM positive  -CT chest with pleural effusions/atelectasis no obvious consolidation of infiltrate but d/t persistent symptoms and atypical course for mycoplasma, will treat  - no allergy to azithromycin  - start azithromycin 500 X1 then 250 mg daily for 5 days  - blood cx no growth  - supportive care/isolation for RSV  - monitor temps  -tolerating abx well so far; no side effects noted.  -reason for abx use and side effects reviewed with patient  - f/u cbc      2. other issues - care per medicine

## 2017-12-20 NOTE — PROGRESS NOTE ADULT - SUBJECTIVE AND OBJECTIVE BOX
CC: cough (16 Dec 2017 19:29)    HPI:  86 yo F with PMH of HTN, dyslipidemia, PNA,  Advanced dementia,  questionable TIA,  diverticulitis, p/w cough  and SOB.  Patient is a poor historian due to dementia and doesn't appear to know where she is and why she came in. As per ED note patient came in with cough / fever / chills. Tested positive for RSV. In ED, patient was also treated for possible superimposed bacterial infection given recent hospitalization. Patient denies any symptoms at this time.         INTERVAL HPI/ OVERNIGHT EVENTS: , Pt was seen and examined, comfortable,  awake and alert, baseline confuse, OOB to chair  reports  no complains, + cough. No SOB. Aid at bedside, states overall better.    Vital Signs Last 24 Hrs  T(C): 37.2 (20 Dec 2017 16:50), Max: 38.1 (20 Dec 2017 01:42)  T(F): 99 (20 Dec 2017 16:50), Max: 100.6 (20 Dec 2017 01:42)  HR: 96 (20 Dec 2017 16:50) (96 - 101)  BP: 145/74 (20 Dec 2017 16:50) (127/54 - 167/61)  RR: 18 (20 Dec 2017 16:50) (18 - 20)  SpO2: 98% (20 Dec 2017 16:50) (97% - 98%)    REVIEW OF SYSTEMS:  Unable to obtain due to confusion     PHYSICAL EXAM:  General: Well developed; well nourished; in no acute distress  Eyes: PERRLA, EOMI; conjunctiva and sclera clear  Head: Normocephalic; atraumatic  ENMT: No nasal discharge; airway clear  Neck: + enlarged thyroid,  non tender  Respiratory: Decreased BS at bases,  no  wheezes  Cardiovascular: Regular rate and rhythm. S1 and S2 Normal; No murmurs  Gastrointestinal: Soft non-tender non-distended; Normal bowel sounds  Genitourinary: No costovertebral angle tenderness  Extremities: Preserved  range of motion, No  edema  Vascular: Peripheral pulses palpable 2+ bilaterally  Neurological: Alert and oriented x1, grossly non focal   Skin: Warm and dry. No acute rash  Lymph Nodes: No acute cervical adenopathy  Musculoskeletal: Normal muscle  tone, without deformities  Psychiatric: Cooperative      LABS:                           10.2   14.0  )-----------( 176      ( 20 Dec 2017 07:42 )             31.4     12-20    143  |  116<H>  |  8   ----------------------------<  91  3.9   |  19<L>  |  0.76    Ca    8.6      20 Dec 2017 07:42      Urinalysis Basic - ( 19 Dec 2017 16:30 )    Color: Yellow / Appearance: Clear / S.005 / pH: x  Gluc: x / Ketone: Negative  / Bili: Negative / Urobili: Negative mg/dL   Blood: x / Protein: Negative mg/dL / Nitrite: Negative   Leuk Esterase: Negative / RBC: x / WBC x   Sq Epi: x / Non Sq Epi: x / Bacteria: x      Lactate, Blood: 0.7 mmol/L ( @ 07:42)    Culture - Blood (17 @ 05:51)    Specimen Source: .Blood None    Culture Results:   No growth to date.      Culture - Blood (17 @ 05:46)    Specimen Source: .Blood None    Culture Results:   No growth to date.          Culture - Urine (17 @ 17:25)    Specimen Source: .Urine Clean Catch (Midstream)    Culture Results:   <10,000 CFU/ml Normal Urogenital ángela present    MEDICATIONS  (STANDING):  ALBUTerol    0.083% 2.5 milliGRAM(s) Nebulizer every 6 hours  azithromycin   Tablet 500 milliGRAM(s) Oral daily  escitalopram 10 milliGRAM(s) Oral daily  guaiFENesin  milliGRAM(s) Oral every 12 hours  heparin  Injectable 5000 Unit(s) SubCutaneous every 12 hours  megestrol Suspension 625 milliGRAM(s) Oral daily  simvastatin 10 milliGRAM(s) Oral at bedtime  sodium chloride 0.45%. 1000 milliLiter(s) (50 mL/Hr) IV Continuous <Continuous>  sodium chloride 0.9%. 1000 milliLiter(s) (1000 mL/Hr) IV Continuous <Continuous>    MEDICATIONS  (PRN):  acetaminophen   Tablet 650 milliGRAM(s) Oral every 6 hours PRN For Temp greater than 38 C (100.4 F)  docusate sodium 100 milliGRAM(s) Oral three times a day PRN Constipation  sodium chloride 0.65% Nasal 2 Spray(s) Both Nostrils every 1 hour PRN Congestion        RADIOLOGY & ADDITIONAL TESTS:    EXAM:  CT CHEST                        PROCEDURE DATE:  2017      The thyroid gland is enlarged with multiple small coarse calcifications,   compatible with a thyroid goiter..    There is small bilateral pleural effusions with underlying atelectasis.   No focal consolidation..  The trachea and major bronchi are patent.    There is a prominent precarinal lymph node measuring up to 0.9 cm in   short axis. There are several additional small mediastinal and hilar   lymph nodes..  The heart size is normal.      Limited evaluation of the upper abdomen is unremarkable.      IMPRESSION: Small bilateral pleural effusions with underlying   atelectasis. No focal consolidation..

## 2017-12-20 NOTE — PROGRESS NOTE ADULT - SUBJECTIVE AND OBJECTIVE BOX
84 yo F with PMH of HTN, dyslipidemia, h/o PNA, dementia, h/o questionable TIA, h/o diverticulitis, p/w cough. Patient is a poor historian due to dementia and doesn't appear to know where she is and why she came in. As per ED note patient came in with cough / fever / chills. In ER T to 100.4, wbc ct 15.9, xray (-), was given IV vancomycin/cefepime d/t concern for infection, had CT chest with b/l small pleural effusions/atelectasis, no infilrates or consolidation.     having persistent fevers  occasional cough  feels weak        MEDICATIONS  (STANDING):  ALBUTerol    0.083% 2.5 milliGRAM(s) Nebulizer every 6 hours  azithromycin   Tablet 500 milliGRAM(s) Oral daily  escitalopram 10 milliGRAM(s) Oral daily  guaiFENesin  milliGRAM(s) Oral every 12 hours  heparin  Injectable 5000 Unit(s) SubCutaneous every 12 hours  megestrol Suspension 625 milliGRAM(s) Oral daily  simvastatin 10 milliGRAM(s) Oral at bedtime  sodium chloride 0.45%. 1000 milliLiter(s) (50 mL/Hr) IV Continuous <Continuous>  sodium chloride 0.9%. 1000 milliLiter(s) (1000 mL/Hr) IV Continuous <Continuous>      Vital Signs Last 24 Hrs  T(C): 36.3 (20 Dec 2017 12:28), Max: 38.1 (20 Dec 2017 01:42)  T(F): 97.4 (20 Dec 2017 12:28), Max: 100.6 (20 Dec 2017 01:42)  HR: 98 (20 Dec 2017 12:28) (97 - 102)  BP: 127/54 (20 Dec 2017 12:28) (127/54 - 167/61)  BP(mean): --  RR: 18 (20 Dec 2017 12:28) (18 - 20)  SpO2: 97% (20 Dec 2017 12:28) (97% - 98%)            PE:  Constitutional: frail looking  HEENT: NC/AT, EOMI, PERRLA  Neck: supple  Back: no tenderness  Respiratory: decreased breath sounds  Cardiovascular: S1S2 regular, no murmurs  Abdomen: soft, not tender, not distended, positive BS  Genitourinary: deferred  Rectal: deferred  Musculoskeletal: no muscle tenderness, no joint swelling or tenderness  Extremities: no pedal edema  Neurological:  no focal deficits  Skin: no rashes    Labs:                                10.2   14.0  )-----------( 176      ( 20 Dec 2017 07:42 )             31.4     12-20    143  |  116<H>  |  8   ----------------------------<  91  3.9   |  19<L>  |  0.76    Ca    8.6      20 Dec 2017 07:42             Cultures:   Culture - Blood (12.19.17 @ 05:51)    Specimen Source: .Blood None    Culture Results:   No growth to date.    Culture - Blood (12.19.17 @ 05:46)    Specimen Source: .Blood None    Culture Results:   No growth to date.    Strep Ag (-)  legionella ag (-)  Mycoplasma IgM +  RVP + RSV          Radiology:  < from: CT Chest No Cont (12.17.17 @ 08:54) >  EXAM:  CT CHEST                            PROCEDURE DATE:  12/17/2017          INTERPRETATION:  Exam Date: 12/17/2017 8:54 AM    CT chest without contrast    CLINICAL INFORMATION:  p/w cough / fever - possible PNA?    TECHNIQUE:  Contiguous axial 3 mm sections were obtained through the   chest using single helical acquisition.  In addition, 2D sagittal and   coronal reformatted images obtained.   This scan was performed using   automatic exposure control (radiation dose reduction software) to obtain   a diagnostic image quality scan with patient dose as low as reasonably   achievable.        FINDINGS:   No previous examinations are available for review.    The thyroid gland is enlarged with multiple small coarse calcifications,   compatible with a thyroid goiter..    There is small bilateral pleural effusions with underlying atelectasis.   No focal consolidation..  The trachea and major bronchi are patent.    There is a prominent precarinal lymph node measuring up to 0.9 cm in   short axis. There are several additional small mediastinal and hilar   lymph nodes..  The heart size is normal.      Limited evaluation of the upper abdomen is unremarkable.      IMPRESSION: Small bilateral pleural effusions with underlying   atelectasis. No focal consolidation..          Advanced directives addressed: full resuscitation

## 2017-12-20 NOTE — PROGRESS NOTE ADULT - ASSESSMENT
84 yo F with PMH of HTN, dyslipidemia, h/o PNA, dementia, h/o questionable TIA, h/o diverticulitis, p/w cough      * Sepsis 2/2 RSV infection,  acute bronchitis likely viral and atypical Mycoplasma infection   -fevers better, mild leukocytosis  - Mycoplasma IgM positive  - Repeat CXR neg for PNA   - Repeat UA neg  - Repeat BCX: NGTD  -Lactate improved   - Lactate WNL this am  - Supportive care: C/w Neb TX RTC, Mucinex for cough, nasal saline   - S/p Levaquin x 1 dose and now started on Azythromycin  - D/w Dr Church        *  Lactic acidosis, resolved   - suspect sepsis due to Viral  syndrome  - D/c IVF    * metabolic encephalopathy  - 2/2 sepsis, dehydration, and ARF and baseline dementia  - Supportive care  - Hold Remeron for now       *MIGDALIA, resolved   -2/2 sepsis and  dehydration  -oral hydration   -Hold ACEi      *HTN / dyslipidemia  -C/w home meds     * abnormal TFT's   - TSH Low, T3 Low, Normal free T4  - due to sick euthyroid       *DVT ppx  -Heparin SubQ    Dispo: monitor closely, c/w current management, if continues to improve d/c planning

## 2017-12-21 ENCOUNTER — TRANSCRIPTION ENCOUNTER (OUTPATIENT)
Age: 82
End: 2017-12-21

## 2017-12-21 VITALS
DIASTOLIC BLOOD PRESSURE: 59 MMHG | TEMPERATURE: 98 F | RESPIRATION RATE: 18 BRPM | OXYGEN SATURATION: 97 % | SYSTOLIC BLOOD PRESSURE: 142 MMHG | HEART RATE: 90 BPM

## 2017-12-21 LAB
CULTURE RESULTS: SIGNIFICANT CHANGE UP
HCT VFR BLD CALC: 34.3 % — LOW (ref 34.5–45)
HGB BLD-MCNC: 11.1 G/DL — LOW (ref 11.5–15.5)
MCHC RBC-ENTMCNC: 29.6 PG — SIGNIFICANT CHANGE UP (ref 27–34)
MCHC RBC-ENTMCNC: 32.4 GM/DL — SIGNIFICANT CHANGE UP (ref 32–36)
MCV RBC AUTO: 91.4 FL — SIGNIFICANT CHANGE UP (ref 80–100)
PLATELET # BLD AUTO: 224 K/UL — SIGNIFICANT CHANGE UP (ref 150–400)
RBC # BLD: 3.75 M/UL — LOW (ref 3.8–5.2)
RBC # FLD: 15.4 % — HIGH (ref 10.3–14.5)
SPECIMEN SOURCE: SIGNIFICANT CHANGE UP
WBC # BLD: 11.7 K/UL — HIGH (ref 3.8–10.5)
WBC # FLD AUTO: 11.7 K/UL — HIGH (ref 3.8–10.5)

## 2017-12-21 RX ORDER — FLUTICASONE PROPIONATE 50 MCG
1 SPRAY, SUSPENSION NASAL
Qty: 1 | Refills: 0 | OUTPATIENT
Start: 2017-12-21 | End: 2018-01-19

## 2017-12-21 RX ORDER — AZITHROMYCIN 500 MG/1
1 TABLET, FILM COATED ORAL
Qty: 4 | Refills: 0 | OUTPATIENT
Start: 2017-12-21 | End: 2017-12-24

## 2017-12-21 RX ORDER — SODIUM CHLORIDE 0.65 %
2 AEROSOL, SPRAY (ML) NASAL
Qty: 0 | Refills: 0 | COMMUNITY
Start: 2017-12-21

## 2017-12-21 RX ADMIN — HEPARIN SODIUM 5000 UNIT(S): 5000 INJECTION INTRAVENOUS; SUBCUTANEOUS at 06:36

## 2017-12-21 RX ADMIN — ESCITALOPRAM OXALATE 10 MILLIGRAM(S): 10 TABLET, FILM COATED ORAL at 11:40

## 2017-12-21 RX ADMIN — MEGESTROL ACETATE 625 MILLIGRAM(S): 40 SUSPENSION ORAL at 14:41

## 2017-12-21 RX ADMIN — AZITHROMYCIN 500 MILLIGRAM(S): 500 TABLET, FILM COATED ORAL at 11:40

## 2017-12-21 RX ADMIN — ALBUTEROL 2.5 MILLIGRAM(S): 90 AEROSOL, METERED ORAL at 07:55

## 2017-12-21 RX ADMIN — ALBUTEROL 2.5 MILLIGRAM(S): 90 AEROSOL, METERED ORAL at 03:24

## 2017-12-21 RX ADMIN — ALBUTEROL 2.5 MILLIGRAM(S): 90 AEROSOL, METERED ORAL at 13:50

## 2017-12-21 RX ADMIN — Medication 600 MILLIGRAM(S): at 06:36

## 2017-12-21 NOTE — DISCHARGE NOTE ADULT - MEDICATION SUMMARY - MEDICATIONS TO TAKE
I will START or STAY ON the medications listed below when I get home from the hospital:    aspirin 81 mg oral tablet  -- 1 tab(s) by mouth once a day  -- Indication: For PPxs     ramipril 2.5 mg oral tablet  -- 1 dose(s) by mouth once a day  -- Indication: For HTN    mirtazapine 15 mg oral tablet  -- 1 tab(s) by mouth once a day (at bedtime)  -- Indication: For Dementia    escitalopram 10 mg oral tablet  -- 1 tab(s) by mouth once a day  -- Indication: For Anxiety    Zocor 10 mg oral tablet  -- 1 tab(s) by mouth once a day (at bedtime)  -- Indication: For HLD    megestrol 625 mg/5 mL oral suspension  -- 5 milliliter(s) by mouth once a day  -- Indication: For Anorexia    guaiFENesin 200 mg/5 mL oral liquid  -- 5 milliliter(s) by mouth 4 times a day, As Needed for cough   -- Indication: For cough    azithromycin 500 mg oral tablet  -- 1 tab(s) by mouth once a day  -- Indication: For Acute bronchitis    sodium chloride 0.65% nasal spray  -- 2 spray(s) into nose 3 to 4 times a day as needed   -- Indication: For postnasal drip    Flonase 50 mcg/inh nasal spray  -- 1 spray(s) intranasally 2 times a day   -- For the nose.  It is very important that you take or use this exactly as directed.  Do not skip doses or discontinue unless directed by your doctor.    -- Indication: For Postnasal drip

## 2017-12-21 NOTE — DISCHARGE NOTE ADULT - PLAN OF CARE
resolve Complete oral abxs monitor F/u with DR Petit for evaluation F/u with DR Petit for repeat Chest CT

## 2017-12-21 NOTE — PROGRESS NOTE ADULT - SUBJECTIVE AND OBJECTIVE BOX
86 yo F with PMH of HTN, dyslipidemia, h/o PNA, dementia, h/o questionable TIA, h/o diverticulitis, p/w cough. Patient is a poor historian due to dementia and doesn't appear to know where she is and why she came in. As per ED note patient came in with cough / fever / chills. In ER T to 100.4, wbc ct 15.9, xray (-), was given IV vancomycin/cefepime d/t concern for infection, had CT chest with b/l small pleural effusions/atelectasis, no infilrates or consolidation.     fevers are down  feels better  less cough        MEDICATIONS  (STANDING):  ALBUTerol    0.083% 2.5 milliGRAM(s) Nebulizer every 6 hours  azithromycin   Tablet 500 milliGRAM(s) Oral daily  escitalopram 10 milliGRAM(s) Oral daily  guaiFENesin  milliGRAM(s) Oral every 12 hours  heparin  Injectable 5000 Unit(s) SubCutaneous every 12 hours  megestrol Suspension 625 milliGRAM(s) Oral daily  simvastatin 10 milliGRAM(s) Oral at bedtime  sodium chloride 0.45%. 1000 milliLiter(s) (50 mL/Hr) IV Continuous <Continuous>  sodium chloride 0.9%. 1000 milliLiter(s) (1000 mL/Hr) IV Continuous <Continuous>      Vital Signs Last 24 Hrs  T(C): 36.3 (20 Dec 2017 12:28), Max: 38.1 (20 Dec 2017 01:42)  T(F): 97.4 (20 Dec 2017 12:28), Max: 100.6 (20 Dec 2017 01:42)  HR: 98 (20 Dec 2017 12:28) (97 - 102)  BP: 127/54 (20 Dec 2017 12:28) (127/54 - 167/61)  BP(mean): --  RR: 18 (20 Dec 2017 12:28) (18 - 20)  SpO2: 97% (20 Dec 2017 12:28) (97% - 98%)            PE:  Constitutional: frail looking  HEENT: NC/AT, EOMI, PERRLA  Neck: supple  Back: no tenderness  Respiratory: decreased breath sounds  Cardiovascular: S1S2 regular, no murmurs  Abdomen: soft, not tender, not distended, positive BS  Genitourinary: deferred  Rectal: deferred  Musculoskeletal: no muscle tenderness, no joint swelling or tenderness  Extremities: no pedal edema  Neurological:  no focal deficits  Skin: no rashes    Labs:                                           11.1   11.7  )-----------( 224      ( 21 Dec 2017 12:11 )             34.3     12-20    143  |  116<H>  |  8   ----------------------------<  91  3.9   |  19<L>  |  0.76    Ca    8.6      20 Dec 2017 07:42                 Cultures:   Culture - Blood (12.19.17 @ 05:51)    Specimen Source: .Blood None    Culture Results:   No growth to date.    Culture - Blood (12.19.17 @ 05:46)    Specimen Source: .Blood None    Culture Results:   No growth to date.    Strep Ag (-)  legionella ag (-)  Mycoplasma IgM +  RVP + RSV          Radiology:  < from: CT Chest No Cont (12.17.17 @ 08:54) >  EXAM:  CT CHEST                            PROCEDURE DATE:  12/17/2017          INTERPRETATION:  Exam Date: 12/17/2017 8:54 AM    CT chest without contrast    CLINICAL INFORMATION:  p/w cough / fever - possible PNA?    TECHNIQUE:  Contiguous axial 3 mm sections were obtained through the   chest using single helical acquisition.  In addition, 2D sagittal and   coronal reformatted images obtained.   This scan was performed using   automatic exposure control (radiation dose reduction software) to obtain   a diagnostic image quality scan with patient dose as low as reasonably   achievable.        FINDINGS:   No previous examinations are available for review.    The thyroid gland is enlarged with multiple small coarse calcifications,   compatible with a thyroid goiter..    There is small bilateral pleural effusions with underlying atelectasis.   No focal consolidation..  The trachea and major bronchi are patent.    There is a prominent precarinal lymph node measuring up to 0.9 cm in   short axis. There are several additional small mediastinal and hilar   lymph nodes..  The heart size is normal.      Limited evaluation of the upper abdomen is unremarkable.      IMPRESSION: Small bilateral pleural effusions with underlying   atelectasis. No focal consolidation..          Advanced directives addressed: full resuscitation

## 2017-12-21 NOTE — DISCHARGE NOTE ADULT - CARE PLAN
Principal Discharge DX:	Mycoplasmal tracheobronchitis  Goal:	resolve  Instructions for follow-up, activity and diet:	Complete oral abxs  Secondary Diagnosis:	Goiter, euthyroid  Goal:	monitor  Instructions for follow-up, activity and diet:	F/u with DR Petit for evaluation  Secondary Diagnosis:	Mediastinal lymphadenopathy  Goal:	monitor  Instructions for follow-up, activity and diet:	F/u with DR Petit for repeat Chest CT

## 2017-12-21 NOTE — DISCHARGE NOTE ADULT - HOSPITAL COURSE
86 yo F with PMH of HTN, dyslipidemia, PNA,  Advanced dementia,  questionable TIA,  diverticulitis, p/w cough  and SOB.  On work up was found to be febrile, MIGDALIA, dehydration,  RSV positive, CXR/CT neg for PNA, sepsis protocol started.  Abxs were given in ER and IVF. Pt was taken off abx as though to have viral syndrome, but continued to be febrile,  repeat CXR and BCX remained neg, but Mycoplasma IgM came back +. Pt was started on abxs for acute mycoplasma bronchitis Today pt is afebrile, OOB to chair, good appetite, denies any complains. As per son at bedside, her mentation at baseline. Pt was evaluated by PT and options d/w HC. Decided to take Pt home with HC and Pt has private aids.  Meds and outpt f/u discussed   Vital Signs Last 24 Hrs  T(C): 36.9 (21 Dec 2017 11:46), Max: 37.8 (20 Dec 2017 23:53)  T(F): 98.4 (21 Dec 2017 11:46), Max: 100.1 (20 Dec 2017 23:53)  HR: 90 (21 Dec 2017 11:46) (85 - 96)  BP: 142/59 (21 Dec 2017 11:46) (142/59 - 157/65)  RR: 18 (21 Dec 2017 11:46) (18 - 19)  SpO2: 97% (21 Dec 2017 11:46) (97% - 98%)    PHYSICAL EXAM:  General: Well developed; well nourished; in no acute distress  Eyes: PERRLA, EOMI; conjunctiva and sclera clear  Head: Normocephalic; atraumatic  ENMT: No nasal discharge; airway clear  Neck: + enlarged thyroid,  non tender  Respiratory: good air entry,   no  wheezes  Cardiovascular: Regular rate and rhythm. S1 and S2 Normal; No murmurs  Gastrointestinal: Soft non-tender non-distended; Normal bowel sounds  Genitourinary: No costovertebral angle tenderness  Extremities: Preserved  range of motion, No  edema  Vascular: Peripheral pulses palpable 2+ bilaterally  Neurological: Alert and oriented x1, grossly non focal   Skin: Warm and dry. No acute rash  Lymph Nodes: No acute cervical adenopathy  Musculoskeletal: Normal muscle  tone, without deformities  Psychiatric: Cooperative    * Sepsis 2/2 RSV infection and  acute Mycoplasma bronchitis   -fevers resolved, , leukocytosis trended down   - Mycoplasma IgM positive  - Repeat CXR neg for PNA   - Repeat UA neg  - Repeat BCX: NGTD  -Lactate improved   - Lactate WNL   - Supportive care: Mucinex for cough, nasal saline   - S/p Levaquin x 1 dose and now started on Azythromycin, will continue outPt   - D/w Dr Church        *  Lactic acidosis, resolved   - suspect sepsis due to Viral  syndrome  - D/c IVF    * metabolic encephalopathy  - 2/2 sepsis, dehydration, and ARF and baseline dementia  - Supportive care  -  now close to baseline  - resume  Remeron       *MIGDALIA, resolved   -2/2 sepsis and  dehydration  -oral hydration   -restart ramipril       *HTN / dyslipidemia  -C/w home meds     * abnormal TFT's. Goiter   - TSH Low, T3 Low, Normal free T4  - due to sick euthyroid   - Will need to f/u with PCP for further evaluation       * Mediastinal lymph nodes  - likely reactive  - will need repeat CT in 4-6 weeks       Dispo: stable for d/c home with HC    Dr Petit called, left message

## 2017-12-21 NOTE — PHYSICAL THERAPY INITIAL EVALUATION ADULT - PLANNED THERAPY INTERVENTIONS, PT EVAL
transfer training/gait training/balance training/bed mobility training balance training/strengthening/transfer training/gait training/bed mobility training

## 2017-12-21 NOTE — PHYSICAL THERAPY INITIAL EVALUATION ADULT - PERTINENT HX OF CURRENT PROBLEM, REHAB EVAL
84 y/o Female with h/o HTN, dyslipidemia, prior PNA, dementia, h/o questionable TIA, diverticulitis was admitted on 12/16 with cough, fever and chills. Patient is a poor historian due to dementia and doesn't appear to know where she is and why she came in. In ER, she tested positive for RSV.

## 2017-12-21 NOTE — PHYSICAL THERAPY INITIAL EVALUATION ADULT - ADDITIONAL COMMENTS
pt ambulates with a rw household distances, has full-time aide 5 days a week, lives with family in a private home.   no steps to enter (stair lift within)

## 2017-12-21 NOTE — PHYSICAL THERAPY INITIAL EVALUATION ADULT - IMPAIRMENTS FOUND, PT EVAL
posture/aerobic capacity/endurance/muscle strength/gait, locomotion, and balance/poor safety awareness

## 2017-12-21 NOTE — DISCHARGE NOTE ADULT - MEDICATION SUMMARY - MEDICATIONS TO CHANGE
[id number]
I will SWITCH the dose or number of times a day I take the medications listed below when I get home from the hospital:  None

## 2017-12-21 NOTE — DISCHARGE NOTE ADULT - PATIENT PORTAL LINK FT
“You can access the [facility] Patient Portal, offered by [facility], by registering with the following website: [email or web address]”

## 2017-12-21 NOTE — PROGRESS NOTE ADULT - ASSESSMENT
86 yo F with PMH of HTN, dyslipidemia, h/o PNA, dementia, h/o questionable TIA, h/o diverticulitis, p/w cough. Patient is a poor historian due to dementia and doesn't appear to know where she is and why she came in. As per ED note patient came in with cough / fever / chills. In ER T to 100.4, wbc ct 15.9, xray (-), was given IV vancomycin/cefepime d/t concern for infection, had CT chest with b/l small pleural effusions/atelectasis, no infilrates or consolidation.     1. sepsis/RSV/viral syndrome/mycoplasma pna  -improving  - mycoplasma IgM positive  -CT chest with pleural effusions/atelectasis no obvious consolidation of infiltrate but d/t persistent symptoms and atypical course for mycoplasma, will treat  - no allergy to azithromycin  - on azithromycin #2, complete total 5 days  - blood cx no growth  - supportive care/isolation for RSV  - monitor temps  -tolerating abx well so far; no side effects noted.  -reason for abx use and side effects reviewed with patient  - f/u cbc      2. other issues - care per medicine

## 2017-12-21 NOTE — DISCHARGE NOTE ADULT - OTHER SIGNIFICANT FINDINGS
EXAM:  CT CHEST                        PROCEDURE DATE:  12/17/2017      FINDINGS:   No previous examinations are available for review.    The thyroid gland is enlarged with multiple small coarse calcifications,   compatible with a thyroid goiter..    There is small bilateral pleural effusions with underlying atelectasis.   No focal consolidation..  The trachea and major bronchi are patent.    There is a prominent precarinal lymph node measuring up to 0.9 cm in   short axis. There are several additional small mediastinal and hilar   lymph nodes..  The heart size is normal.      Limited evaluation of the upper abdomen is unremarkable.      IMPRESSION: Small bilateral pleural effusions with underlying   atelectasis. No focal consolidation..

## 2017-12-22 LAB
CULTURE RESULTS: SIGNIFICANT CHANGE UP
CULTURE RESULTS: SIGNIFICANT CHANGE UP
SPECIMEN SOURCE: SIGNIFICANT CHANGE UP
SPECIMEN SOURCE: SIGNIFICANT CHANGE UP

## 2017-12-28 DIAGNOSIS — Z87.891 PERSONAL HISTORY OF NICOTINE DEPENDENCE: ICD-10-CM

## 2017-12-28 DIAGNOSIS — J40 BRONCHITIS, NOT SPECIFIED AS ACUTE OR CHRONIC: ICD-10-CM

## 2017-12-28 DIAGNOSIS — N17.9 ACUTE KIDNEY FAILURE, UNSPECIFIED: ICD-10-CM

## 2017-12-28 DIAGNOSIS — E78.5 HYPERLIPIDEMIA, UNSPECIFIED: ICD-10-CM

## 2017-12-28 DIAGNOSIS — B97.4 RESPIRATORY SYNCYTIAL VIRUS AS THE CAUSE OF DISEASES CLASSIFIED ELSEWHERE: ICD-10-CM

## 2017-12-28 DIAGNOSIS — J90 PLEURAL EFFUSION, NOT ELSEWHERE CLASSIFIED: ICD-10-CM

## 2017-12-28 DIAGNOSIS — A41.9 SEPSIS, UNSPECIFIED ORGANISM: ICD-10-CM

## 2017-12-28 DIAGNOSIS — E04.9 NONTOXIC GOITER, UNSPECIFIED: ICD-10-CM

## 2017-12-28 DIAGNOSIS — F03.90 UNSPECIFIED DEMENTIA WITHOUT BEHAVIORAL DISTURBANCE: ICD-10-CM

## 2017-12-28 DIAGNOSIS — E86.0 DEHYDRATION: ICD-10-CM

## 2017-12-28 DIAGNOSIS — I10 ESSENTIAL (PRIMARY) HYPERTENSION: ICD-10-CM

## 2017-12-28 DIAGNOSIS — E87.2 ACIDOSIS: ICD-10-CM

## 2017-12-28 DIAGNOSIS — G93.41 METABOLIC ENCEPHALOPATHY: ICD-10-CM

## 2017-12-29 ENCOUNTER — RX RENEWAL (OUTPATIENT)
Age: 82
End: 2017-12-29

## 2017-12-29 RX ORDER — SIMVASTATIN 10 MG/1
10 TABLET, FILM COATED ORAL
Qty: 30 | Refills: 3 | Status: ACTIVE | COMMUNITY
Start: 2017-07-05 | End: 1900-01-01

## 2018-02-03 ENCOUNTER — EMERGENCY (EMERGENCY)
Facility: HOSPITAL | Age: 83
LOS: 0 days | Discharge: HOME | End: 2018-02-03
Attending: EMERGENCY MEDICINE | Admitting: EMERGENCY MEDICINE
Payer: MEDICARE

## 2018-02-03 VITALS
HEIGHT: 65 IN | TEMPERATURE: 98 F | OXYGEN SATURATION: 100 % | RESPIRATION RATE: 17 BRPM | WEIGHT: 119.93 LBS | HEART RATE: 78 BPM | DIASTOLIC BLOOD PRESSURE: 74 MMHG | SYSTOLIC BLOOD PRESSURE: 146 MMHG

## 2018-02-03 DIAGNOSIS — S00.83XA CONTUSION OF OTHER PART OF HEAD, INITIAL ENCOUNTER: ICD-10-CM

## 2018-02-03 DIAGNOSIS — F03.90 UNSPECIFIED DEMENTIA WITHOUT BEHAVIORAL DISTURBANCE: ICD-10-CM

## 2018-02-03 DIAGNOSIS — R51 HEADACHE: ICD-10-CM
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
## 2018-02-03 PROCEDURE — 73502 X-RAY EXAM HIP UNI 2-3 VIEWS: CPT | Mod: 26

## 2018-02-03 PROCEDURE — 72125 CT NECK SPINE W/O DYE: CPT | Mod: 26

## 2018-02-03 PROCEDURE — 70450 CT HEAD/BRAIN W/O DYE: CPT | Mod: 26

## 2018-02-03 PROCEDURE — 70486 CT MAXILLOFACIAL W/O DYE: CPT | Mod: 26

## 2018-02-03 PROCEDURE — 76377 3D RENDER W/INTRP POSTPROCES: CPT | Mod: 26

## 2018-02-03 PROCEDURE — 99285 EMERGENCY DEPT VISIT HI MDM: CPT

## 2018-02-03 RX ORDER — ACETAMINOPHEN 500 MG
650 TABLET ORAL ONCE
Qty: 0 | Refills: 0 | Status: COMPLETED | OUTPATIENT
Start: 2018-02-03 | End: 2018-02-03

## 2018-02-03 RX ADMIN — Medication 650 MILLIGRAM(S): at 15:00

## 2018-02-03 NOTE — ED PROVIDER NOTE - OBJECTIVE STATEMENT
84 y/o female with a PMHx of dementia presents to the ED s/p fall at home today. Pt fell down 2 stairs today and hit the left side of her face. Pt presents with abrasion to left side of her face. No fever. On Tylenol prn. Unable to obtain a complete and accurate history due to the pt's dementia.

## 2018-12-26 NOTE — DISCHARGE NOTE ADULT - ABILITY TO HEAR (WITH HEARING AID OR HEARING APPLIANCE IF NORMALLY USED):
IUD REMOVAL    Preoperative diagnosis:  Encounter for Mirena IUD removal    Postoperative diagnosis:  Same plus desire for pregnancy    Procedure performed: Mirena IUD removal    PROCEDURE:     Mirena IUD removal was performed today 12/26/2018 on Chloe Ernandez using sterile technique at all times.  The sterile speculum was placed. The Mirena IUD strings were identified and grasped with ring forceps.  The patient was asked to cough hard and as she did so, the IUD strings were pulled with the ring forceps.  The Mirena IUD was removed in toto without difficulty.  Intact IUD verified by the doctor and the patient.  The IUD had been in place for 4 years.  There was minimal bleeding from the removal.  The speculum was removed.  Bimanual exam significant for normal retroverted uterus with slight posterior irregularity consistent with a fibroid.  The adnexa were not distinctly palpable.    Excellent hemostasis was noted.    The patient tolerated the procedure well and was discharged home in stable condition.  She will take Ibuprofen of Tylenol as needed for cramping.    Discharge instructions were discussed in detail and the patient is to call for any problems or concerns. She is to follow up in the office prn.    All sponge and instrument counts were correct ×2. Needles were not utilized.    Preconceptual planning also discussed including the need for prenatal vitamin for folic acid daily, healthy diet, exercise, avoiding alcohol and second hand smoke, travel to areas known to have Zika virus.  The patient was given the handout on [place] before pregnancy and later childbearing.  She is to call with her first missed menstrual cycle.     
There are no preventive care reminders to display for this patient.    Patient is up to date, no discussion needed.          
Mildly to Moderately Impaired: difficulty hearing in some environments or speaker may need to increase volume or speak distinctly

## 2019-04-09 NOTE — PATIENT PROFILE ADULT. - PROVIDER NOTIFICATION
Anesthesia POST Procedure Evaluation    Patient: Buffy Carpenter   MRN:     [id number] Gender:   female   Age:    4 Julian Davis MD  Pediatric Staff Anesthesiologist  [facility]  Pager [phone]  Phone [phone]            Last Anesthesia Record Vitals:  CRNA Yes

## 2019-08-15 NOTE — PROVIDER CONTACT NOTE (OTHER) - NAME OF MD/NP/PA/DO NOTIFIED:
DR Ramirez Detail Level: Generalized Quality 131: Pain Assessment And Follow-Up: Pain assessment using a standardized tool is documented as negative, no follow-up plan required Quality 130: Documentation Of Current Medications In The Medical Record: Current Medications Documented Additional Notes: Pain intensity 0/10 yes

## 2019-10-11 NOTE — DISCHARGE NOTE ADULT - MEDICATION SUMMARY - MEDICATIONS TO CHANGE
"I'm dying"  refrain for every question/bizarre behavior
I will SWITCH the dose or number of times a day I take the medications listed below when I get home from the hospital:  None

## 2021-02-16 NOTE — PATIENT PROFILE ADULT. - SURGICAL SITE INCISION
Discussed results for pelvic US with patient. She does not feel that she needs to have a f/u appointment with Dr. Sorensen at this time- but is agreeable to having annual exam appointment set up for 1 year from now.     PSR provided patient information to call and schedule.   Patient verbalized her understanding and had no further questions or concerns at this time.   
Left voicemail to return call     *ultrasound pelvis results - normal f/u with routine future care  
Left voicemail to return call     ----- Message from Dorcas Sorensen DO sent at 2/11/2021  4:38 PM CST -----  Please contact patient with normal results.  Please have her follow up as discussed in last appointment.  If no follow up is necessary, encourage patient to follow up with annual gyn exam    
Patient returning missed call  
no

## 2022-04-27 NOTE — ED ADULT TRIAGE NOTE - CCCP TRG CHIEF CMPLNT
pain, arm
Additional Notes: Discussed with patient treatment options for the face  blue light vs. Efudex cream.
Detail Level: Zone
Render Risk Assessment In Note?: no

## 2022-05-18 NOTE — ED PROVIDER NOTE - PSYCHIATRIC, MLM
Alert and oriented to person, place, time/situation. normal mood and affect. no apparent risk to self or others. Stable

## 2023-02-09 NOTE — DISCHARGE NOTE ADULT - DISCHARGE DATE
Maribel Sue  [id number]  [place] [address]      3/2/2023    Dear Maribel,     We have made several attempts to contact you by telephone, but have been unable to do so.       Please call the clinic at your earliest convenience to schedule and verify your appointments.      Sincerely,     Dr. Jacek Telles    [address]  [facility] [address] [phone]  
21-Dec-2017

## 2023-09-21 NOTE — DISCHARGE NOTE ADULT - CAREGIVER PHONE NUMBER
"Lois Parmar is a 74 y.o. female returns for     Chief Complaint   Patient presents with    Right Hip - Follow-up       HISTORY OF PRESENT ILLNESS:   Patient is here today for follow up of right hip.   Patient states that she is having pain in her hip especially with increased activity.  She also has pain in her right shoulder.  The pain in the shoulder is mostly at night and with activity.  Sometimes the pain in her shoulder is severe.  She has pain with activities of daily living involving her hip and her shoulder.  She thinks that her shoulder may be the most limiting issue right now.     CONCURRENT MEDICAL HISTORY:    The following portions of the patient's history were reviewed and updated as appropriate: allergies, current medications, past family history, past medical history, past social history, past surgical history and problem list.     ROS  No fevers or chills.  No chest pain or shortness of air.  No GI or  disturbances.    PHYSICAL EXAMINATION:       Ht 154.9 cm (61\")   Wt 105 kg (231 lb 11.2 oz)   LMP  (LMP Unknown) Comment: 1980 w BSO  BMI 43.78 kg/m²     Physical Exam  Constitutional:       General: She is not in acute distress.     Appearance: Normal appearance.   Pulmonary:      Effort: Pulmonary effort is normal. No respiratory distress.   Neurological:      Mental Status: She is alert and oriented to person, place, and time.       GAIT:     []  Normal  []  Antalgic    Assistive device: []  None  []  Walker     []  Crutches  []  Cane     []  Wheelchair  []  Stretcher    Right Shoulder Exam     Tenderness   The patient is experiencing tenderness in the acromioclavicular joint and acromion.    Range of Motion   Active abduction:  160   Forward flexion:  160     Muscle Strength   Abduction: 4/5   Supraspinatus: 4/5     Tests   Beverly test: positive  Cross arm: positive  Impingement: positive                      ASSESSMENT:    Diagnoses and all orders for this visit:    Right hip " pain    Obstructive sleep apnea syndrome    Mild intermittent asthma without complication    Rotator cuff syndrome of right shoulder          PLAN    She still has pretty good range of motion involving the right shoulder.  She does have tenderness with challenging the shoulder.  We discussed general strength and conditioning exercises.  We discussed the possibility of steroid injection and the possibility of physical therapy but the patient just wants to do home exercises at this time.  Follow-up as needed with further treatment as dictated by the change in symptoms.    Return if symptoms worsen or fail to improve, for recheck.    Curt Augustine MD    [phone]

## 2023-10-02 NOTE — DISCHARGE NOTE ADULT - PRINCIPAL DIAGNOSIS
Quality 130: Documentation Of Current Medications In The Medical Record: Current Medications Documented Detail Level: Detailed Quality 431: Preventive Care And Screening: Unhealthy Alcohol Use - Screening: Patient not identified as an unhealthy alcohol user when screened for unhealthy alcohol use using a systematic screening method Quality 47: Advance Care Plan: Advance Care Planning discussed and documented in the medical record; patient did not wish or was not able to name a surrogate decision maker or provide an advance care plan. Quality 111:Pneumonia Vaccination Status For Older Adults: Patient received any pneumococcal conjugate or polysaccharide vaccine on or after their 60th birthday and before the end of the measurement period Quality 226: Preventive Care And Screening: Tobacco Use: Screening And Cessation Intervention: Patient screened for tobacco use and is an ex/non-smoker Mycoplasmal tracheobronchitis

## 2023-12-12 NOTE — DISCHARGE NOTE ADULT - NS MD DC PLAN IMMU FLU PROVIDE INFO
Rest  Drink plenty of fluids  BLAND diet  Follow up with pcp  Return if sx worsen    Vaccine Information Sheet (VIS) provided-VIS date: 8/07/15/Risks/benefits discussed with patient or patient surrogate

## 2023-12-23 NOTE — PATIENT PROFILE ADULT. - PHONE #
"  [facility]      Name:  Leigh Negron   Age:  42 y.o.  Sex:  female  :  1981  MRN:  [id number]   Visit Number:  [id number]  Admission Date:  2023  Date Of Service:  23  Primary Care Physician:  Marlen Navarrete MD    Chief Complaint:     Burning sensation in the chest.    History Of Presenting Illness:      Ms. Jara is a 42-year-old female with history of tobacco abuse, ADHD was brought to the emergency room by family member with symptoms of burning sensation of the chest, body aches for the last 1 day.  Apparently her daughter tested positive for COVID 6 weeks ago and patient herself thought she had COVID at that time as she felt bad for several days afterwards.  She has been doing some drywall work at her home for the last several days and she also vapes.  She denies using any new vapes recently.  She states that she is having severe burning sensation from the lower neck to the epigastric region radiating to her neck and left arm.  This was not associated with any shortness of breath.  She denies any prior history of heart disease and states that she has normal effort tolerance.  No family history of heart disease.  She denies recreational drugs but occasionally uses \"edibles\".    In the emergency room, she was afebrile but initial blood pressure was elevated in the 180s.  Pulse oxygen saturation was 100% on room air.  Blood work including CMP and CBC was fairly unremarkable except for potassium of 3.3.  Patient's troponin however was 246 with a repeat at 236.  EKG showed inferior wall ST depressions.  C-reactive protein was within normal limits.  COVID-19 test came back positive but the flu test was negative.  Chest x-ray was unremarkable.  She subsequently had CT angiogram of the chest which was negative for pulmonary embolism or infiltrates but showed small right thyroid nodule, recommend correlation with ultrasound.  Patient's " condition was discussed with Dr. Daly from cardiology who felt that the patient may have viral induced pericarditis and recommended admission for observation.    Review Of Systems:    All systems were reviewed and negative except as mentioned in history of presenting illness, assessment and plan.    Past Medical History: Patient  has a past medical history of ADHD (attention deficit hyperactivity disorder) (10/2002), Allergic (2002), Anemia, Anxiety (10/2006), Depression (2018), Diverticulitis (), Diverticulitis of colon (2023), Diverticulosis (2023), Exposure to sexually transmitted disease (STD), Headache, History of anemia, History of body piercing, History of ECG (2016), History of hypertension, History of kidney infection, History of migraine, Hyperlipidemia, Hypertension (), Low back pain (2016), Migraine (10/05/2023), Obesity (2019), PMS (premenstrual syndrome), PONV (postoperative nausea and vomiting) (), Prehypertension, Recurrent genital herpes, Scoliosis (2008), Urinary tract infection (), Varicella (), and Visual impairment (2002).    Past Surgical History: Patient  has a past surgical history that includes  section; [place] tooth extraction; Forearm fracture surgery; Umbilical hernia repair; Breast surgery (2016); Cosmetic surgery (2016); Spine surgery (2020); Breast Augmentation (2016); and Tubal Sterilization (N/A, 2023).    Social History: Patient  reports that she has been smoking electronic cigarette. She has been exposed to tobacco smoke. She has never used smokeless tobacco. She reports current alcohol use of about 1.0 standard drink of alcohol per week. She reports that she does not use drugs.    Family History:  Patient family history includes Arthritis in her father, mother, paternal uncle, and another family member; Breast cancer in her paternal grandmother; Cancer in her father; Diabetes in her mother; Drug  abuse in her father; Hyperlipidemia in her father and mother; Hypertension in her father; Mental illness in her father; Migraines in her father; Obesity in her father and mother; Osteoporosis in her mother; Stroke in her father.     Allergies:      Augmentin [amoxicillin-pot clavulanate] and Bactrim [sulfamethoxazole-trimethoprim]    Home Medications:    Prior to Admission Medications       Prescriptions Last Dose Informant Patient Reported? Taking?    acetaminophen (Tylenol 8 Hour) 650 MG 8 hr tablet   No No    Take 1 tablet by mouth Every 8 (Eight) Hours As Needed for Mild Pain .    amphetamine-dextroamphetamine (ADDERALL) 10 MG tablet   Yes No    Take 1 tablet by mouth Daily. Takes daily when not taking Vyvanse.    cyclobenzaprine (FLEXERIL) 5 MG tablet   No No    Take 1 tablet by mouth At Night As Needed for Muscle Spasms.    dicyclomine (BENTYL) 20 MG tablet   No No    Take 1 tablet by mouth Every 6 (Six) Hours As Needed (abdominal cramps).    fluticasone (FLONASE) 50 MCG/ACT nasal spray   Yes No    ibuprofen (ADVIL,MOTRIN) 600 MG tablet   No No    Take 1 tablet by mouth Every 6 (Six) Hours As Needed for Mild Pain or Moderate Pain.    lisdexamfetamine (VYVANSE) 50 MG capsule   Yes No    Take 1 capsule by mouth Daily Takes daily on the days she works.    oxyCODONE-acetaminophen (PERCOCET) 5-325 MG per tablet   No No    Take 1 tablet by mouth Every 6 (Six) Hours As Needed for Severe Pain    valACYclovir (VALTREX) 500 MG tablet   No No    Take 1 tablet by mouth Daily.    Patient taking differently:  Take 1 tablet by mouth As Needed.     ED Medications:    Medications   sodium chloride 0.9 % flush 10 mL (has no administration in time range)   aspirin chewable tablet 324 mg (324 mg Oral Given 12/22/23 1856)   iopamidol (ISOVUE-300) 61 % injection 100 mL (100 mL Intravenous Given 12/22/23 1925)     Vital Signs:  Temp:  [97.9 °F (36.6 °C)] 97.9 °F (36.6 °C)  Heart Rate:  [82] 82  Resp:  [16] 16  BP: (186)/(126)  "186/126        12/22/23  1702   Weight: 80.5 kg (177 lb 6.4 oz)     Body mass index is 26.2 kg/m².    Physical Exam:     Most recent vital Signs: BP (!) 186/126 (BP Location: Left arm, Patient Position: Sitting)   Pulse 82   Temp 97.9 °F (36.6 °C) (Oral)   Resp 16   Ht 175.3 cm (69\")   Wt 80.5 kg (177 lb 6.4 oz)   SpO2 100%   BMI 26.20 kg/m²     Physical Exam  Constitutional:       General: She is not in acute distress.     Appearance: Normal appearance. She is not ill-appearing.   HENT:      Head: Normocephalic and atraumatic.      Right Ear: External ear normal.      Left Ear: External ear normal.      Nose: Nose normal.      Mouth/Throat:      Mouth: Mucous membranes are moist.   Eyes:      Extraocular Movements: Extraocular movements intact.      Conjunctiva/sclera: Conjunctivae normal.   Cardiovascular:      Rate and Rhythm: Normal rate and regular rhythm.      Pulses: Normal pulses.      Heart sounds: No murmur heard.     No friction rub.   Pulmonary:      Effort: Pulmonary effort is normal.      Breath sounds: Normal breath sounds. No wheezing or rales.   Abdominal:      General: Bowel sounds are normal.      Palpations: Abdomen is soft.      Tenderness: There is no abdominal tenderness. There is no guarding or rebound.   Musculoskeletal:         General: Normal range of motion.      Cervical back: Neck supple.      Right lower leg: No edema.      Left lower leg: No edema.   Skin:     General: Skin is warm.      Findings: No erythema or rash.   Neurological:      General: No focal deficit present.      Mental Status: She is alert and oriented to person, place, and time. Mental status is at baseline.   Psychiatric:         Mood and Affect: Mood normal.         Behavior: Behavior normal.       Laboratory data:    I have reviewed the labs done in the emergency room.    Results from last 7 days   Lab Units 12/22/23  1732   SODIUM mmol/L 140   POTASSIUM mmol/L 3.3*   CHLORIDE mmol/L 103   CO2 mmol/L 23.9 "   BUN mg/dL 8   CREATININE mg/dL 0.78   CALCIUM mg/dL 9.2   BILIRUBIN mg/dL 0.3   ALK PHOS U/L 54   ALT (SGPT) U/L 26   AST (SGOT) U/L 41*   GLUCOSE mg/dL 115*     Results from last 7 days   Lab Units 12/22/23  1852   WBC 10*3/mm3 7.67   HEMOGLOBIN g/dL 15.1   HEMATOCRIT % 45.3   PLATELETS 10*3/mm3 256         Results from last 7 days   Lab Units 12/22/23  1852 12/22/23  1732   HSTROP T ng/L 236* 246*     EKG:      EKG done in the emergency room was reviewed by me.  It shows sinus rhythm at 80 bpm.  Normal axis.  ST depression noted in the inferior limb leads and lateral chest leads.  No abnormal Q waves were noted.  No clear AR depression was noted.    Radiology:    CT Angiogram Chest Pulmonary Embolism    Result Date: 12/22/2023  FINAL REPORT TECHNIQUE: null CLINICAL HISTORY: chest pain, elevated troponin. covid positive COMPARISON: null FINDINGS: CT angiogram of the chest. Multiplanar MIPS were obtained. No comparison. Findings: No PE is identified. No dissection of the thoracic aorta is seen. Small right thyroid nodule. No pleural or pericardial effusion. Bilateral breast implants. No consolidation. The lungs are well aerated.     Impression: No PE is identified. Small right thyroid nodule consider correlation with ultrasound. Authenticated and Electronically Signed by Javier Ramos MD on 12/22/2023 08:09:46 PM     Assessment:    Chest pain with some typical features, POA.  Elevated troponins with abnormal EKG, POA.  Suspected viral pericarditis/myocarditis, POA.  Essential hypertension, uncontrolled.  Chronic tobacco dependence.  Mild hypokalemia, POA.    Plan:    Chest pain/elevated troponin/abnormal EKG.  - Exact etiology of the patient's chest pain is uncertain but seems to be typical in nature with elevated troponin levels and EKG changes.  - Differential diagnosis includes coronary artery disease as well as viral induced pericarditis/myocarditis.  - Patient has already received full dose aspirin and we will  continue her on low-dose aspirin therapy.  - Avoid Lovenox at this time.  - Obtain 2D echocardiogram in the morning.  - We will consult Dr. Daly from cardiology for further recommendations.  - Obtain fasting lipids in a.m. and repeat troponin levels.  - Lortab for pain control.  - Obtain urine drug screen.    Essential hypertension.  - Patient does seem to have elevated blood pressures but does not take any medications at home.  - Nitropaste every 6 hours.  - Start Toprol-XL.  - Hydralazine as needed.    Chronic tobacco dependence.  - Nicotine patch.    I have discussed the patient's condition and treatment plan with her fiancé who is at the bedside.  Discussed with nursing staff at the bedside.  We will keep her n.p.o. until evaluated by cardiology in the morning.    Risk Assessment: Moderate  DVT Prophylaxis: SCDs  Code Status: Full  Diet: Cardiac    Advance Care Planning   ACP discussion was held with the patient during this visit. Patient does not have an advance directive, information provided.         Cong Melendez MD  12/22/23  20:22 EST    Dictated utilizing Dragon dictation.   [phone]

## 2024-03-26 NOTE — ED ADULT NURSE NOTE - MUSCULOSKELETAL ASSESSMENT
Reviewed medication list with patient.  Currently taking medications 6 different times throughout the day which is interfering with his QOL and adding to his irritability.      Current:  6 am Pantroprazole and Voriconazole  8 am Dabrafenib  9 am Breakfast - Prednisone, Vitamin C & D  Noon Trametinib  4-5 pm Voriconazole  6-7 pm Dinner - Xarelto  2 hours later - 8-9 pm - Dabrafenib.    Reviewed interaction risk with [facility] pharmacist:    New medication regimen:    6 am Pantoprazole, Voriconazole, Dabrafentib, Trametinib, Bactrim DS  8-9 am Breakfast Prednisone, Vitamin C & D, Xarelto  4-5 pm Vorizonazole, Dabrafenib and Bactrim DS (Rec closer to 5 pm since BID dosing in am is at 6 am)  5-6 pm Dinner    Educated that Xarelto rx says nightly but per PI, once daily so ok to take at noon with meals.     Will refill Breo    Patient wanted to resume Lexapro as prescribed by Dr. Hernandez.  Per pharmacy, monitor QT interval.  Noted January 2024 EKG.  Repeat now and, if ok, start Lexapro and repeat EKG 1 week after.  Order placed.    WDL

## 2024-05-28 NOTE — PATIENT PROFILE ADULT. - TEACHING/LEARNING FACTORS IMPACT ABILITY TO LEARN
[facility] Consultation / Initial Visit      Today's Date: 5/28/2024    PCP: Luz Wilson MD  Referring Provider: Juan Carlos Haskins MD    INDICATION FOR CONSULTATION: shock      HPI:       84 year old male with a PMH of DM, HFrEF 40%, CAD s/p CABG, AF on Eliquis, PAD, CAD, HTN, HLD presents with concern for lower limb ischemia 5/23 with worsening right foot pain.  He was seen by podiatry and vascular surgery who recommended lower extremity angiogram and ID was following for concern of chronic nonhealing ulcer.  Patient [place] with [facility] as an outpatient would not like to continue care with [facility] for general cardiology and Dr. Agarwal for PVD.    5/27 rapid response was called for worsening mental status hypoxia and hypotension.  He was transferred to the ICU for volume overload and kidney failure.  ABG with worsening respiratory acidosis he was intubated with concern for RV dysfunction.  ABG with worsening respiratory acidosis he was intubated due to concern for worsening RV function with increasing pressor requirements.  Nephrology evaluating and suspect worsening kidney function due to contrast nephropathy.  He is currently on CRRT    Drips: Precedex, fentanyl, 5 levo, vasopressin, 2.5 dobutamine    ROS:   Unable to assess     Relevant Past Medical History:  Past Medical History:   Diagnosis Date    Acute systolic heart failure  (CMD) 01/10/2024    -(6/5/23) Echo with EF 55%  -(12/31/23) Echo with EF 25%  -received IV diuresis during admission  -metoprolol succinate 50mg daily, losartan 25 mg daily, and spironolactone 12.5 mg MWF    MIAN (acute kidney injury) (CMD) 01/10/2024    resolved    CAD (coronary artery disease)     Cardiomyopathy  (CMD)     CHF (congestive heart failure)  (CMD)     Chronic ulcer of lower extremity  (CMD)     Chronic ulcer of skin  (CMD)     Diabetes  (CMD)     H/O carotid endarterectomy     Right  Feb 20113    Hx of CABG 2010    Hyperlipidemia      Hypertension     PAD (peripheral artery disease) (CMD)     Pancytopenia  (CMD)     Pneumonia due to COVID-19 virus 12/31/2023    Admitted w/ acute hypoxic respiratory failure due to COVID pneumonia. Given rapid improvement in hypoxia and in context of profound hyperglycemia, recieved three day course of remdesivir and dexamethasone  also heart failure exacerbation contributing to hypoxia was treated.    PVD (peripheral vascular disease) (CMD)       Past Surgical History:   Procedure Laterality Date    Bone marrow biopsy  12/2022    Coronary artery bypass graft  2010    Cyst removal      liver    Endarterectomy  02/2013    Removal gallbladder      Remv 2nd cataract,corn-scler sectn          Social History:  Social History     Tobacco Use   Smoking Status Never   Smokeless Tobacco Never     Social History     Substance and Sexual Activity   Alcohol Use Never     History   Drug Use Unknown       Family History:   Family History   Problem Relation Age of Onset    Diabetes Mother     Diabetes Father     Hypertension Sister     Cancer, Prostate Brother     Heart disease Brother     Hypertension Brother     Hyperlipidemia Brother        Inpatient Medications  Current Facility-Administered Medications   Medication Dose Route Frequency Provider Last Rate Last Admin    insulin lispro (ADMELOG,HumaLOG) - Correction Dose   Subcutaneous 4 times per day Nate Rodriguez DO   2 Units at 05/28/24 1242    famotidine (PEPCID) tablet 20 mg  20 mg Per NG Tube Daily Ponce Lambert MD   20 mg at 05/28/24 1241    heparin (porcine) injection 5,000 Units  5,000 Units Subcutaneous 3 times per day Ponce Lambert MD        [Held by provider] gabapentin (NEURONTIN) capsule 100 mg  100 mg Oral Nightly Ponce Lambert MD        [Held by provider] docusate sodium-sennosides (SENOKOT S) 50-8.6 MG 2 tablet  2 tablet Per NG Tube QHS Nate Rodriguez DO        CARBOXYMethylcellulose (REFRESH TEARS) 0.5 % ophthalmic solution 1 drop  1 drop Both Eyes 6  times per day Nate Rodriguez DO   1 drop at 05/28/24 1241    chlorhexidine gluconate (PERIDEX) 0.12 % solution 15 mL  15 mL Swish & Spit 2 times per day Nate Rodriguez DO   15 mL at 05/28/24 0808    [Held by provider] clopidogrel (PLAVIX) tablet 75 mg  75 mg Oral Daily Mohsin, Sana, DO   75 mg at 05/27/24 0844    [Held by provider] ezetimibe (ZETIA) tablet 10 mg  10 mg Oral Daily Mohsin, Sana, DO   10 mg at 05/27/24 0844    sodium chloride 0.9 % injection 2 mL  2 mL Intracatheter 2 times per day Mohsin, Sana, DO   2 mL at 05/28/24 1044      Current Facility-Administered Medications   Medication Dose Route Frequency Provider Last Rate Last Admin    fentaNYL (SUBLIMAZE) 2,500 mcg/250 mL in sodium chloride 0.9 % infusion  0-150 mcg/hr Intravenous Continuous Ponce Lambert MD 15 mL/hr at 05/28/24 1159 150 mcg/hr at 05/28/24 1159    Standard Replacement Fluid with Calcium, Potassium Chloride 4 mEq/L (PRISMASOL BGK 4/2.5)   CRRT Continuous Kris Jaimes MD 2,000 mL/hr at 05/28/24 1248 New Bag at 05/28/24 1248    dexMEDEtomidine (PRECEDEX) 400 mcg/100 mL in sodium chloride 0.9 % infusion  0-1 mcg/kg/hr (Dosing Weight) Intravenous Continuous Ponce Lambert MD 3.9 mL/hr at 05/28/24 1315 0.2 mcg/kg/hr at 05/28/24 1315    sodium chloride 0.9% infusion   Intravenous Continuous PRN Ponce Lambert MD        sodium chloride 0.9% infusion   Intravenous Continuous PRN Ponce Lambert MD        DOBUTamine (DOBUTREX) 500 mg/250 mL dextrose 5 % infusion  2.5 mcg/kg/min (Dosing Weight) Intravenous Continuous Jackelin Lew MD 5.9 mL/hr at 05/28/24 1159 2.5 mcg/kg/min at 05/28/24 1159    vasopressin (VASOSTRICT) 20 unit/100 mL dextrose 5 % infusion  0.04 Units/min Intravenous Continuous Jackelin Lew MD 12 mL/hr at 05/28/24 1159 0.04 Units/min at 05/28/24 1159    NORepinephrine (LEVOPHED) 8 mg/250 mL in dextrose 5 % infusion  0-100 mcg/min Intravenous Continuous Nate Rodriguez DO 1.88 mL/hr at 05/28/24 1159 1 mcg/min at  05/28/24 1159      Current Facility-Administered Medications   Medication Dose Route Frequency Provider Last Rate Last Admin    fentaNYL bolus from bag  mcg   mcg Intravenous Q15 Min PRN Ponce Lambert MD        lipase-protease-amylase 10,440-39,150-39,150 units (VIOKACE) per tablet 2 tablet  2 tablet Nasogastric PRN Ponce Lambert MD        And    sodium bicarbonate tablet 650 mg  650 mg Per NG Tube PRN Ponce Lambert MD        sodium chloride (NORMAL SALINE) 0.9 % bolus 1,000 mL  1,000 mL CRRT PRN Kris Jaimes MD        sodium chloride 0.9% infusion   Intravenous Continuous PRN Ponce Lambert MD        sodium chloride 0.9% infusion   Intravenous Continuous PRN Ponce Lambert MD        sodium chloride 0.9 % flush bag 25 mL  25 mL Intravenous PRN Ponce Lambert MD        potassium phosphate 20 mmol in sodium chloride 0.9 % 250 mL IVPB  20 mmol Intravenous Q12H PRN Jonatan Cody MD        Or    sodium PHOSPHATE 20 mmol in sodium chloride 0.9 % 250 mL IVPB  20 mmol Intravenous Q12H PRN Jonatan Cody MD        magnesium sulfate 2 g in 50 mL premix IVPB  2 g Intravenous Q12H PRN Jonatan Cody MD        potassium CHLORIDE 20 MEQ/50ML IVPB premix 20 mEq  20 mEq Intravenous PRN Jonatan Cody MD        potassium CHLORIDE 20 MEQ/50ML IVPB premix 20 mEq  20 mEq Intravenous PRN Jonatan Cody MD        sodium bicarbonate 8.4 % injection 50 mEq  50 mEq Intravenous PRN Jonatan Cody MD        sodium chloride 0.9 % injection 10 mL  10 mL Intracatheter PRN Jonatan Cody MD        sodium chloride (NORMAL SALINE) 0.9 % bolus 1,000 mL  1,000 mL CRRT PRN Jonatan Cody MD        CARBOXYMethylcellulose (REFRESH TEARS) 0.5 % ophthalmic solution 1 drop  1 drop Both Eyes PRN Nate Rodriguez DO        chlorhexidine gluconate (PERIDEX) 0.12 % solution 15 mL  15 mL Swish & Spit PRN Nate Rodriguez DO        [Held by provider] oxyCODONE-acetaminophen (PERCOCET) 5-325 MG tablet 1  tablet  1 tablet Oral Q4H PRN Mohsin, Melissa, DO   1 tablet at 05/26/24 1458    sodium chloride 0.9 % flush bag 25 mL  25 mL Intravenous PRN Mohsin, Melissa, DO        ondansetron (ZOFRAN ODT) disintegrating tablet 4 mg  4 mg Oral Q12H PRN Mohsin, Melissa, DO        Or    ondansetron (ZOFRAN) injection 4 mg  4 mg Intravenous Q12H PRN Mohsin, Melissa, DO   4 mg at 05/27/24 1839    polyethylene glycol (MIRALAX) packet 17 g  17 g Oral Daily PRN Mohsin, Melissa, DO        melatonin tablet 3 mg  3 mg Oral Nightly PRN Mohsin, Melissa, DO        calcium carbonate (TUMS) chewable tablet 500 mg  500 mg Oral Q4H PRN Mohsin, Melissa, DO        dextrose 50 % injection 25 g  25 g Intravenous PRN Mohsin, Melissa, DO        dextrose 50 % injection 12.5 g  12.5 g Intravenous PRN Mohsin, Melissa, DO        glucagon (GLUCAGEN) injection 1 mg  1 mg Intramuscular PRN Mohsin, Melissa, DO        dextrose (GLUTOSE) 40 % gel 15 g  15 g Oral PRN Mohsin, Melissa, DO        dextrose (GLUTOSE) 40 % gel 30 g  30 g Oral PRN Mohsin, Melissa, DO            Allergy   ALLERGIES:   Allergen Reactions    Flomax DIZZINESS    Metformin Hcl DIARRHEA            Examination:      Vital Last Value 24 Hour Range   Temperature 97.9 °F (36.6 °C) (05/28/24 0800) Temp  Min: 97.1 °F (36.2 °C)  Max: 99 °F (37.2 °C)   Pulse 91 (05/28/24 1300) Pulse  Min: 79  Max: 146   Respiratory (!) 24 (05/28/24 1300) Resp  Min: 0  Max: 40   Non-Invasive  Blood Pressure 105/51 (05/28/24 0413) BP  Min: 105/51  Max: 131/64   Pulse Oximetry 100 % (05/28/24 1300) SpO2  Min: 46 %  Max: 100 %   Arterial   Blood Pressure 121/51 (05/28/24 1300) Arterial Line BP  Min: 93/54  Max: 152/68     Tele: Atrial fibrillation, rate 70s to 80s, PVCs, episode of NSVT at 7 AM      Intake/Output Summary (Last 24 hours) at 5/28/2024 1329  Last data filed at 5/28/2024 1159  Gross per 24 hour   Intake 2335.53 ml   Output 1257 ml   Net 1078.53 ml        Weight    05/23/24 2056 05/25/24 0500 05/26/24 0300 05/28/24 0600   Weight: 78.9 kg (173  lb 15.1 oz) 78.3 kg (172 lb 9.9 oz) 80.1 kg (176 lb 9.4 oz) 90.5 kg (199 lb 8.3 oz)         GENERAL: No apparent distress intubated and sedated  HEENT: Normocephalic.  Neck:  Supple neck.   Oral mucosa : Pink and moist.    Endocrine: There is no goiter.  CVS: Regular rate and irregular rhythm.  Normal first and second heart tones.   Lung fields: Chemical breath sounds bilaterally  GI: Soft. Nontender, nondistended.    Lower extremity: No cyanosis, clubbing or edema.   Peripheral vascular: Small ulcer on the lateral left foot and large ulcer of the right first toe  Neuro: Awake and alert.  Nonfocal examination.  Psych: Appropriate mood and affect  Integumentary: Warm and Dry      Clinical Data:       The following were personally visualized and interpreted by me:    LABS:    CBC  Recent Labs   Lab 05/28/24 0459 05/27/24 2132 05/27/24  1201   WBC 2.1* 1.0* 2.2*   HCT 22.7* 23.9* 27.6*   HGB 7.1* 7.3* 8.3*   PLT 34* 16* 48*       CMP  Recent Labs   Lab 05/28/24 0459 05/27/24 2132 05/27/24  1416   SODIUM 135 135 133*   POTASSIUM 4.8 5.0 5.3*   CHLORIDE 107 109 110   CO2 18* 14* 18*   GLUCOSE 204* 168* 151*   BUN 60* 76* 75*   CREATININE 2.02* 2.78* 2.78*   CALCIUM 8.4 7.8* 7.8*   TOTPROTEIN 6.1* 6.2* 6.4   ALBUMIN 2.3* 2.5* 2.5*   BILIRUBIN 0.9 0.9 1.0   AST 2,189* 2,138* 1,625*   GPT 1,866* 1,753* 1,426*   ALKPT 112 127* 121*       Cardiac Labs  Recent Labs   Lab 05/28/24  0924 05/28/24 0459 05/28/24 0220 05/27/24 2019 05/27/24  0826 05/25/24  0448   TSH  --   --   --   --   --  1.107   HTROPI 5,592* 4,920* 5,731*   < >  --   --    NTPROB  --   --   --   --  8,387* 9,047*    < > = values in this interval not displayed.       Lipid Panel  No results found    Coags  Recent Labs   Lab 05/28/24  0459 05/27/24  2132 05/27/24  1416 05/27/24  1200   INR  --   --   --  1.2   PTT 28 49* 31 31       ABG  Recent Labs   Lab 05/28/24  1159 05/28/24  0840 05/28/24  0537   RAPH 7.31* 7.35 7.21*   RAPCO2 39 32* 41   RAPO2 148*  196* 198*   RAHCO3 20* 18* 16*   RASAT 99 100* 100*       IMAGING:    ECG:   Encounter Date: 24   Electrocardiogram 12-Lead   Result Value    Ventricular Rate EKG/Min (BPM) 95    Atrial Rate (BPM) 174    QRS-Interval (MSEC) 116    QT-Interval (MSEC) 376    QTc 473    R Axis (Degrees) -38    T Axis (Degrees) 141    REPORT TEXT      Atrial fibrillation  Left axis deviation  Low voltage QRS  Right bundle branch block  Septal infarct  (cited on or before  27-MAY-2024)  St-T wave abnormality  consider inferolateral ischemia  Abnormal ECG  When compared with ECG of  27-MAY-2024 22:58,  Right bundle branch block  is now  present  Questionable change in initial forces of  Septal leads  ST changes slightly improved on current EKG  Confirmed by RANJANA LIM MD ([id number]) on 2024 7:53:11 AM          Echocardiogram:  Results for orders placed during the hospital encounter of 24    TRANSTHORACIC ECHO (TTE) COMPLETE W/ W/O IMAGING AGENT    Narrative  *[facility]*  [address] [place] [id number] [phone]  Transthoracic Echocardiogram (TTE)    Patient: Gustavo Samuel     Study Date/Time:      May 27 2024 12:22PM  MRN:     [id number]              FIN#:                 [id number]  :     1939           Ht/Wt:                152cm 80kg  Age:     84                   BSA/BMI:              1.88m^2 34.6kg/m^2  Gender:  M                    Baseline BP:          99 / 47  Ordering Physician:    Tuan Hugo MD    Referring Physician:   Tuan Hugo MD    Attending Physician:   Alka Moore    Diagnostic Physician:  González Viera MD  Sonographer:           Aquiles TAYLOR    ------------------------------------------------------------------------------  INDICATIONS:   Hypotension.    ------------------------------------------------------------------------------  STUDY CONCLUSIONS  SUMMARY:    1. Left ventricle: The cavity size is normal. Wall thickness is mildly  increased.  Systolic function is moderately reduced. The ejection fraction  was measured by visual estimation. The ejection fraction is 40%.  2. Right ventricle: The cavity size is mildly to moderately dilated. Systolic  function is moderately reduced. Systolic pressure is mildly increased. The  estimated peak pressure is 46mm Hg.    ------------------------------------------------------------------------------  STUDY DATA:   Procedure:  A transthoracic echocardiogram was performed. Image  quality was adequate.  M-mode, complete 2D, complete spectral Doppler, and  color Doppler.  Study status:  STAT.  Study completion:  There were no  complications.    FINDINGS    LEFT VENTRICLE:  The cavity size is normal. Wall thickness is mildly  increased. Systolic function is moderately reduced. There is moderate diffuse  hypokinesis.  There is no evidence of a thrombus revealed by acoustic contrast  opacification.    The ejection fraction was measured by visual estimation. The  ejection fraction is 40%. Cannot assess LV diastolic function.    AORTIC VALVE:  The annulus is normal-sized and mildly calcified. The valve is  trileaflet. The leaflets are mildly thickened. Velocity is within the normal  range. There is no stenosis. There is no regurgitation.    AORTA:  Aortic root: The root is normal-sized.  Ascending aorta: The vessel is normal-sized.    MITRAL VALVE:  The annulus is normal-sized. The annulus is normal. The  leaflets are normal thickness. Inflow velocity is within the normal range.  There is no evidence for stenosis. There is trivial regurgitation.    ATRIAL SEPTUM:   Color doppler shows no obvious shunt.    LEFT ATRIUM:  The atrium is normal in size.    RIGHT VENTRICLE:  The cavity size is mildly to moderately dilated. Systolic  function is moderately reduced.  Systolic pressure is mildly increased. The  estimated peak pressure is 46mm Hg.       The RV pressure during systole is  46mm Hg.    PULMONIC VALVE:   Poorly  visualized. Velocity is within the normal range.  There is no evidence for stenosis. There is no regurgitation.    TRICUSPID VALVE:  The annulus is normal-sized. The leaflets are normal  thickness. Inflow velocity is within the normal range. There is no evidence  for stenosis. There is mild regurgitation.    RIGHT ATRIUM:  The atrium is dilated.       The estimated central venous  pressure is 3mm Hg.    PERICARDIUM:   There is no pericardial effusion.    SYSTEMIC VEINS:  Inferior vena cava: The IVC is normal-sized.  Respirophasic diameter changes  are in the normal range (>= 50%).    ------------------------------------------------------------------------------  Measurements    Left ventricle            Value        Ref       05/16/2024  Left atrium              Value          Ref       05/16/2024  HILL, LAX chord        (N) 4.8   cm     4.2 - 5.8 4.7         AP dim, ES           (H) 4.2   cm       3.0 - 4.0 4.5  ESD, LAX chord        (N) 3.8   cm     2.5 - 4.0 3.7         AP dim index, ES     (N) 2.2   cm/m^2   1.5 - 2.3 2.3  HILL/bsa, LAX chord    (N) 2.6   cm/m^2 2.2 - 3.0 2.4         Area ES, A4C         (H) 21    cm^2     <=20      21  ESD/bsa, LAX chord    (N) 2.0   cm/m^2 1.3 - 2.1 1.9         Area/bsa ES, A4C         11.4  cm^2/m^2 --------- 10.72  PW, ED, LAX           (H) 1.1   cm     0.6 - 1.0 1.2         Area ES, A2C             18    cm^2     --------- 21  HILL major ax, A4C         7.9   cm     --------- 7.4         Area/bsa ES, A2C         9.59  cm^2/m^2 --------- 11.03  ESD major ax, A4C         7.1   cm     --------- 7.5         Vol, S               (N) 54    ml       18 - 58   ----------  FS major axis, A4C        10    %      --------- -1          Vol/bsa, S           (N) 30    ml/m^2   16 - 34   33  HILL/bsa major ax, A4C     4.2   cm/m^2 --------- 3.8         Vol, ES, 1-p A4C     (H) 62    ml       18 - 58   55  ESD/bsa major ax, A4C     3.8   cm/m^2 --------- 3.9         Vol/bsa, ES, 1-p A4C (N) 33     ml/m^2   12 - 37   29  SHANKAR, A4C                  31.1  cm^2   --------- 27.4        Vol, ES, 1-p A2C     (N) 45    ml       18 - 58   64  ISREAL, A4C                  22.2  cm^2   --------- 22.4        Vol/bsa, ES, 1-p A2C (N) 24    ml/m^2   11 - 43   33  FAC, A4C                  29    %      --------- 18          Vol, ES, 2-p             54    ml       --------- 61  IVS, ED               (H) 1.1   cm     0.6 - 1.0 1.5         Vol/bsa, ES, 2-p     (N) 29    ml/m^2   16 - 34   32  PW, ED                (H) 1.1   cm     0.6 - 1.0 1.2  IVS/PW, ED                0.98         --------- 1.26        Tricuspid valve          Value          Ref       05/16/2024  EDV                   (N) 111   ml     62 - 150  102         TR peak v            (H) 3.3   m/sec    <=2.8     3.2  ESV                   (N) 56    ml     21 - 61   52          Peak RV-RA grad, S       43    mm Hg    --------- 42  EF                    (L) 40    %      52 - 72   43  SV                        45    ml     --------- 42          Aortic root              Value          Ref       05/16/2024  EDV/bsa               (N) 59    ml/m^2 34 - 74   53          Root diam, ED        (N) 3.0   cm       2.6 - 4.0 3.2  ESV/bsa               (N) 30    ml/m^2 11 - 31   27  SV/bsa                    24    ml/m^2 --------- 22          Ascending aorta          Value          Ref       05/16/2024  SV, 1-p A4C               40    ml     --------- 26          AAo AP diam, ED      (N) 2.8   cm       2.2 - 3.8 3.2  SV/bsa, 1-p A4C           21    ml/m^2 --------- 14          AAo AP diam/bsa, ED  (N) 1.5   cm/m^2   1.1 - 1.9 1.7  ESV, 2-p              (N) 60    ml     21 - 61   51  ESV/bsa, 2-p          (H) 32    ml/m^2 11 - 31   27          Pulmonary artery         Value          Ref       05/16/2024  Pressure, S              46    mm Hg    --------- ----------  Right ventricle           Value        Ref       05/16/2024  Pressure, S               46    mm Hg  --------- 45           Systemic veins           Value          Ref       05/16/2024  Estimated CVP            3     mm Hg    --------- 3  Legend:  (L)  and  (H)  hugo values outside specified reference range.    (N)  marks values inside specified reference range.    Prepared and electronically signed by  González Viera MD  05/27/2024 13:00       Cath Report:  No results found for this or any previous visit.      Assessment/Plans:     Milind Samuel is a 83 year old male with past medical history of hypertension, CAD status post CABG x3 in 2010, PVD, CVA, carotid stenosis s/p endartrectomy, h/o shingles, RADHA on CPAP, hyperlipidemia, and diabetes admitted for foot pain concern for acute limb ischemia.  Cardiology consulted for cardiogenic shock    PVD (peripheral vascular disease)  H/o Bilateral lower extremity claudication  - CTA as noted above  - Vascular following, seen by Dr. Parks  - Was On aspirin 81 mg daily and atorvastatin 80 mg daily  -Non healing foot ulcers  -Peripheral angio (5/18/2024): CSI atherectomy and balloon angioplasty of the distal left SFA from 100% 3 tandem lesion with moderate residual stenosis.  Left popliteal artery 100% calcified stenosis.Severe stenosis of the right superficial femoral artery.   -XR of right foot (5/23/2024): Soft tissue ulceration at the lateral forefoot with soft tissue swelling.  No evidence of osteomyelitis.  -MANINDER (5/24/2024): Diffuse abnormal waveforms and unobtainable bilateral posterior tibial pressures.  Diminished bilateral dorsalis pedis artery pressures suspicious for severe disease on the right and moderate disease in the left foot.  Left toe brachial could not be calculated.  Diminished right toe brachial index.  -Vascular surgery following, will plan for angiogram when patient is medically stable as this has been on hold given his acute decompensation    Shock  Acute respiratory failure  -RR called on 5/27 for hypoxia and hypotension, patient clinically fluid overloaded and CXR  revealing increased pulmonary congestion   -Echocardiogram 5/27 with a EF of 40%, RV function moderately reduced with RVSP of 46,  -Continue to wean pressors and dobutamine as tolerated  - Consider Sherwood to further assess hemodynamics  - Continue CRRT for volume removal     Coronary artery disease involving native coronary artery of native heart without angina pectoris  Ischemic Cardiomyopathy  - history of CAD with CABG x3 in 2010  - TTE showed normal wall motion with preserved LV function  - lexiscan in 1/2018 showed a large reversible anterior and lateral wall defect. He underwent a LHC on 1/30/2018 which revealed an EF 55-60% and showed severe 3v CAD with a patent degenerated VG to the OM, patent vein graft to RCA, and patent LIMA to LAD.  - repeat lexiscan (6/2020) again shows moderate to large reversible defect in the anterolateral wall suggestive of ischemia.  - Reversible ischemia on stress test from January 2018 is stable to current stress test. Left heart cath January 2018 revealed disease of SVG/OM/circumflex not amenable to percutaneous intervention  - TTE, 6/5/2023: LVEF 55%, left ventricular concentric hypertrophy , moderately dilated LA  -TTE, 12/31/2023: LVEF 25%, LV systolic function is severely reduced.   - GDMT prior to admission/ follows in CHF clinic  ARNI/ACEI/ARB: Entresto 24/26 mg BID (prior on higher dose but was having issues with hypotension)  BB: Metoprolol XL 50 mg in AM 25 mg in PM  SGLT2-I: Jardiance 10 mg Daily  Hydralazine/Nitrates for African Americans: N/A  MRA : Spironolactone 12.5 mg MWF  ICD (LVEF ? 35%) or CRT-D (LVEF ? 35% with QRS >150 and LBBB): N/A. Plans to repeat TTE in 3 months to evaluate for improvement in LV function  Anticoagulation for AF/AFL: Eliquis 2.5 mg BID  Lasix 10mg daily  -GDMT on hold in the setting of shock, volume removal with CRRT, plan to reinitiate GDMT when he is out of the acute.    Elevated troponin in the setting of demand ischemia  Hx of   NSTEMI  -MRI cardiac from 1/2/2024 showed left ventricle is mildly dilated in size with severely reduced systolic function. LVEF = 17%.  Severely reduced RV systolic function. RVEF = 22%. Mild subendocardial late gadolinium enhancement of the mid anterior and septal wall segments, consistent with an ischemic pattern of enhancement.  - patient and family opted for conservative measures with medical management.  - he has been maintained on aspirin and atorvastatin therapy  -Elevated troponin likely in the setting of demand ischemia given his cardiac history and acute illness  -Continue current medical therapy  -Hold off on angiogram at this time given his contrast nephropathy    atrial fibrillation  - on eliquis 2.5mg bid, he technically would need to be on eliquis 5mg bid given his weight is > 60kg and his Cr < 1.5.  HE previously had more elevated Cr  - also he has thrombocytopenia  -Previously was discussed and family is aware of the increased risk of bleeding with Eliquis  - Anticoagulation on hold in the setting of thrombocytopenia  - Hematology was consulted  -Recommend heparin drip if okay with hematology and surgery    MIAN on CKD  -Thought to be secondary to contrast nephropathy  - Nephrology following, patient on CRRT  -GDMT on hold given acute illness shock and kidney function  -Avoid future contrast      Essential hypertension  Medications held in the setting of acute shock      H/O carotid endarterectomy  - carotid doppler showed no hemodinamically significant stenosis     Hyperlipidemia  - lipid panel, 09/22/2023: cholesterol 165, triglycerides 102, HDL 51, LDL 94  -Home meds: Aotrvastatin 80 mg daily and zetia 10mg daily  -Hold in the setting of shock liver    Shock liver  -Severely elevated LFTs  -Hold statin  -Continue to trend     Discussed with Dr Shy Prince, DO  Cardiology Fellow    Thank you for allowing us to participate in this patient's care.  Please do not hesitate to  call with any questions or concerns.       cognitive limitations

## 2024-07-10 NOTE — PHYSICAL THERAPY INITIAL EVALUATION ADULT - TRANSFER TRAINING, PT EVAL
Follow up dr fernandez 4 weeks      For pain use acetaminophen (Tylenol) or ibuprofen (Motrin / Advil), unless prescribed medications that have acetaminophen or ibuprofen (or similar medications) in it.  You can take over the counter acetaminophen tablets (1 - 2 tablets of the 500-mg strength every 6 hours) or ibuprofen tablets (2 tablets every 4 hours).    Soak in a hot shower or bath tub.  You will have more aches and pains tomorrow, but should feel better in several days.    PLEASE RETURN TO THE EMERGENCY DEPARTMENT IMMEDIATELY for worsening of pain, decrease sensation to arms or legs, inability to move arms or legs, shortness of breath, severe chest pain, excessive nausea or vomiting, notice any bruising to your abdomen or have increase in abdominal pain, or if you develop any concerning symptoms such as: high fever not relieved by acetaminophen (Tylenol) and/or ibuprofen (Motrin / Advil), chills, feeling of your heart fluttering or racing, persistent nausea and/or vomiting, vomiting up blood, blood in your stool, loss of consciousness, numbness, weakness or tingling in the arms or legs or change in color of the extremities, changes in mental status, persistent headache, blurry vision, loss of bladder / bowel control, unable to follow up with your physician, or other any other care or concern.  
sit to stand/stand to sit: [id number]

## 2025-04-23 NOTE — DISCHARGE NOTE ADULT - MEDICATION SUMMARY - MEDICATIONS TO STOP TAKING
> Sustained from a mechanical fall on 3/24/25, found to have an acute, obliquely oriented fracture of the L2 vertebral body with distraction of the dominant fracture fragment and involvement of the anterior and posterior cortices and superior endplate  > At that time, managed non-operatively with LSO bracing, though it does not appear orthopedic spine surgery or neurosurgery evaluated at the time of injury    - Maintain LSO brace with lumbar spinal precautions   - Should follow-up with spine surgery outpatient   I will STOP taking the medications listed below when I get home from the hospital:  None